# Patient Record
Sex: MALE | Race: WHITE | Employment: OTHER | URBAN - METROPOLITAN AREA
[De-identification: names, ages, dates, MRNs, and addresses within clinical notes are randomized per-mention and may not be internally consistent; named-entity substitution may affect disease eponyms.]

---

## 2017-02-11 ENCOUNTER — OFFICE VISIT (OUTPATIENT)
Dept: INTERNAL MEDICINE CLINIC | Age: 62
End: 2017-02-11

## 2017-02-11 VITALS
HEIGHT: 66 IN | HEART RATE: 74 BPM | BODY MASS INDEX: 30.37 KG/M2 | RESPIRATION RATE: 15 BRPM | TEMPERATURE: 96.5 F | OXYGEN SATURATION: 98 % | SYSTOLIC BLOOD PRESSURE: 122 MMHG | WEIGHT: 189 LBS | DIASTOLIC BLOOD PRESSURE: 78 MMHG

## 2017-02-11 DIAGNOSIS — H01.021 SQUAMOUS BLEPHARITIS OF RIGHT UPPER EYELID: ICD-10-CM

## 2017-02-11 DIAGNOSIS — H00.11 CHALAZION OF RIGHT UPPER EYELID: Primary | ICD-10-CM

## 2017-02-11 RX ORDER — ERYTHROMYCIN 5 MG/G
OINTMENT OPHTHALMIC
Qty: 1 TUBE | Refills: 0 | Status: SHIPPED | OUTPATIENT
Start: 2017-02-11 | End: 2017-03-03 | Stop reason: SDUPTHER

## 2017-02-11 NOTE — PATIENT INSTRUCTIONS
GO TO ER OR EYE DOCTOR IF SYMPTOMS WORSEN OR DO NOT IMPROVE AFTER TREATMENT. Styes and Chalazia: Care Instructions  Your Care Instructions    Styes and chalazia (say \"hpk-UPA-aeq-\") are both conditions that can cause swelling of the eyelid. A stye is an infection in the root of an eyelash. The infection causes a tender red lump on the edge of the eyelid. The infection can spread until the whole eyelid becomes red and inflamed. Styes usually break open, and a tiny amount of pus drains. They usually clear up on their own in about a week, but they sometimes need treatment with antibiotics. A chalazion is a lump or cyst in the eyelid (chalazion is singular; chalazia is plural). It is caused by swelling and inflammation of deep oil glands inside the eyelid. Chalazia are usually not infected. They can take a few months to heal.  If a chalazion becomes more swollen and painful or does not go away, you may need to have it drained by your doctor. Follow-up care is a key part of your treatment and safety. Be sure to make and go to all appointments, and call your doctor if you are having problems. It's also a good idea to know your test results and keep a list of the medicines you take. How can you care for yourself at home? · Do not rub your eyes. Do not squeeze or try to open a stye or chalazion. · To help a stye or chalazion heal faster:  ¨ Put a warm, moist compress on your eye for 5 to 10 minutes, 3 to 6 times a day. Heat often brings a stye to a point where it drains on its own. Keep in mind that warm compresses will often increase swelling a little at first.  ¨ Do not use hot water or heat a wet cloth in a microwave oven. The compress may get too hot and can burn the eyelid. · Always wash your hands before and after you use a compress or touch your eyes. · If the doctor gave you antibiotic drops or ointment, use the medicine exactly as directed.  Use the medicine for as long as instructed, even if your eye starts to feel better. · To put in eyedrops or ointment:  ¨ Tilt your head back, and pull your lower eyelid down with one finger. ¨ Drop or squirt the medicine inside the lower lid. ¨ Close your eye for 30 to 60 seconds to let the drops or ointment move around. ¨ Do not touch the ointment or dropper tip to your eyelashes or any other surface. · Do not wear eye makeup or contact lenses until the stye or chalazion heals. · Do not share towels, pillows, or washcloths while you have a stye. When should you call for help? Call your doctor now or seek immediate medical care if:  · You have pain in your eye. · You have a change in vision or loss of vision. · Redness and swelling get much worse. Watch closely for changes in your health, and be sure to contact your doctor if:  · Your stye does not get better in 1 week. · Your chalazion does not start to get better after several weeks. Where can you learn more? Go to http://hilaria-jaswant.info/. Enter X425 in the search box to learn more about \"Styes and Chalazia: Care Instructions. \"  Current as of: May 23, 2016  Content Version: 11.1  © 4193-7532 [employer]. Care instructions adapted under license by [employer] (which disclaims liability or warranty for this information). If you have questions about a medical condition or this instruction, always ask your healthcare professional. [place] [address] any warranty or liability for your use of this information. Blepharitis: Care Instructions  Your Care Instructions    Blepharitis is an inflammation or infection of the eyelids. It causes dry, scaly crusts on the eyelids. It can also cause your eyes to itch, burn, and look red. This problem is more common in people who have rosacea, dandruff, skin allergies, or eczema. Home treatment can help you keep your eyes comfortable.  Your doctor may also prescribe an ointment to put on your eyelids. Follow-up care is a key part of your treatment and safety. Be sure to make and go to all appointments, and call your doctor if you are having problems. It's also a good idea to know your test results and keep a list of the medicines you take. How can you care for yourself at home? · Wash your eyelids and eyebrows daily with baby shampoo. To wash your eyelids:  ¨ Place a very warm washcloth over your eyes for about a minute. This will help soften and loosen the crusts on your eyelashes. ¨ Put a few drops of baby shampoo on a warm washcloth. ¨ Gently wipe your eyelids. This helps remove any crust. It also cleans your eyelids. ¨ Rinse well with water. · Be safe with medicines. If your doctor prescribed medicine for you, use it exactly as directed. Call your doctor if you think you are having a problem with your medicine. When should you call for help? Call your doctor now or seek immediate medical care if:  · You have new vision problems. · Your eyes hurt. · Your eyelids bleed. Watch closely for changes in your health, and be sure to contact your doctor if:  · After 2 weeks of home treatment, your symptoms are not getting better. · Your eye turns red, gets very teary, or has discharge. Where can you learn more? Go to http://hilaria-jaswant.info/. Enter U088 in the search box to learn more about \"Blepharitis: Care Instructions. \"  Current as of: May 23, 2016  Content Version: 11.1  © 6857-6070 [email or web address]. Care instructions adapted under license by [employer] (which disclaims liability or warranty for this information). If you have questions about a medical condition or this instruction, always ask your healthcare professional. [place] [address] any warranty or liability for your use of this information.

## 2017-02-11 NOTE — MR AVS SNAPSHOT
Visit Information Date & Time Provider Department Dept. Phone Encounter #  
 2/11/2017  1:00 PM Sarah Perez[address] [id number] Follow-up Instructions Return if symptoms worsen or fail to improve. Upcoming Health Maintenance Date Due INFLUENZA AGE 9 TO ADULT 8/1/2016 COLONOSCOPY 3/24/2019 DTaP/Tdap/Td series (2 - Td) 7/25/2026 Allergies as of 2/11/2017  Review Complete On: 2/11/2017 By: Sarah Perez,  No Known Allergies Current Immunizations  Never Reviewed Name Date Tdap 7/25/2016  4:43 PM  
  
 Not reviewed this visit You Were Diagnosed With   
  
 Codes Comments Chalazion of right upper eyelid    -  Primary ICD-10-CM: H00.11 ICD-9-CM: 373.2 Squamous blepharitis of right upper eyelid     ICD-10-CM: H01.021 
ICD-9-CM: 373.02 Vitals BP Pulse Temp Resp Height(growth percentile) Weight(growth percentile) 122/78 (BP 1 Location: Left arm, BP Patient Position: Sitting) 74 96.5 °F (35.8 °C) (Oral) 15 5' 6\" (1.676 m) 189 lb (85.7 kg) SpO2 BMI Smoking Status 98% 30.51 kg/m2 Former Smoker Vitals History BMI and BSA Data Body Mass Index Body Surface Area 30.51 kg/m 2 2 m 2 Preferred Pharmacy Pharmacy Name Phone [facility] [phone] Your Updated Medication List  
  
   
This list is accurate as of: 2/11/17  1:32 PM.  Always use your most recent med list.  
  
  
  
  
 acyclovir 400 mg tablet Commonly known as:  ZOVIRAX  
take 1 tablet by mouth twice a day  
  
 aspirin delayed-release 81 mg tablet Take 81 mg by mouth daily. desonide 0.05 % topical ointment Commonly known as:  [place] Apply  to affected area two (2) times a day. erythromycin ophthalmic ointment Commonly known as:  ILOTYCIN Apply small amount to right upper eye lid margin at bedtime for 7 days. levothyroxine 88 mcg tablet Commonly known as:  SYNTHROID  
take 1 tablet by mouth once daily before BREAKFAST  
  
 tamsulosin 0.4 mg capsule Commonly known as:  FLOMAX Take 1 Cap by mouth daily. Prescriptions Sent to Pharmacy Refills  
 erythromycin (ILOTYCIN) ophthalmic ointment 0 Sig: Apply small amount to right upper eye lid margin at bedtime for 7 days. Class: Normal  
 Pharmacy: [address] Susannah Chozaheer [address] [place] [address] #: [phone] Follow-up Instructions Return if symptoms worsen or fail to improve. Patient Instructions GO TO ER OR EYE DOCTOR IF SYMPTOMS WORSEN OR DO NOT IMPROVE AFTER TREATMENT. Styes and Chalazia: Care Instructions Your Care Instructions Styes and chalazia (say \"vnr-WMQ-hwe-\") are both conditions that can cause swelling of the eyelid. A stye is an infection in the root of an eyelash. The infection causes a tender red lump on the edge of the eyelid. The infection can spread until the whole eyelid becomes red and inflamed. Styes usually break open, and a tiny amount of pus drains. They usually clear up on their own in about a week, but they sometimes need treatment with antibiotics. A chalazion is a lump or cyst in the eyelid (chalazion is singular; chalazia is plural). It is caused by swelling and inflammation of deep oil glands inside the eyelid. Chalazia are usually not infected. They can take a few months to heal. 
If a chalazion becomes more swollen and painful or does not go away, you may need to have it drained by your doctor. Follow-up care is a key part of your treatment and safety. Be sure to make and go to all appointments, and call your doctor if you are having problems. It's also a good idea to know your test results and keep a list of the medicines you take. How can you care for yourself at home? · Do not rub your eyes. Do not squeeze or try to open a stye or chalazion. · To help a stye or chalazion heal faster: ¨ Put a warm, moist compress on your eye for 5 to 10 minutes, 3 to 6 times a day. Heat often brings a stye to a point where it drains on its own. Keep in mind that warm compresses will often increase swelling a little at first. 
¨ Do not use hot water or heat a wet cloth in a microwave oven. The compress may get too hot and can burn the eyelid. · Always wash your hands before and after you use a compress or touch your eyes. · If the doctor gave you antibiotic drops or ointment, use the medicine exactly as directed. Use the medicine for as long as instructed, even if your eye starts to feel better. · To put in eyedrops or ointment: ¨ Tilt your head back, and pull your lower eyelid down with one finger. ¨ Drop or squirt the medicine inside the lower lid. ¨ Close your eye for 30 to 60 seconds to let the drops or ointment move around. ¨ Do not touch the ointment or dropper tip to your eyelashes or any other surface. · Do not wear eye makeup or contact lenses until the stye or chalazion heals. · Do not share towels, pillows, or washcloths while you have a stye. When should you call for help? Call your doctor now or seek immediate medical care if: 
· You have pain in your eye. · You have a change in vision or loss of vision. · Redness and swelling get much worse. Watch closely for changes in your health, and be sure to contact your doctor if: 
· Your stye does not get better in 1 week. · Your chalazion does not start to get better after several weeks. Where can you learn more? Go to http://hilaria-jaswant.info/. Enter K613 in the search box to learn more about \"Styes and Chalazia: Care Instructions. \" Current as of: May 23, 2016 Content Version: 11.1 © 0102-2392 [employer].  Care instructions adapted under license by [employer] (which disclaims liability or warranty for this information). If you have questions about a medical condition or this instruction, always ask your healthcare professional. [address] any warranty or liability for your use of this information. Blepharitis: Care Instructions Your Care Instructions Blepharitis is an inflammation or infection of the eyelids. It causes dry, scaly crusts on the eyelids. It can also cause your eyes to itch, burn, and look red. This problem is more common in people who have rosacea, dandruff, skin allergies, or eczema. Home treatment can help you keep your eyes comfortable. Your doctor may also prescribe an ointment to put on your eyelids. Follow-up care is a key part of your treatment and safety. Be sure to make and go to all appointments, and call your doctor if you are having problems. It's also a good idea to know your test results and keep a list of the medicines you take. How can you care for yourself at home? · Wash your eyelids and eyebrows daily with baby shampoo. To wash your eyelids: 
¨ Place a very warm washcloth over your eyes for about a minute. This will help soften and loosen the crusts on your eyelashes. ¨ Put a few drops of baby shampoo on a warm washcloth. ¨ Gently wipe your eyelids. This helps remove any crust. It also cleans your eyelids. ¨ Rinse well with water. · Be safe with medicines. If your doctor prescribed medicine for you, use it exactly as directed. Call your doctor if you think you are having a problem with your medicine. When should you call for help? Call your doctor now or seek immediate medical care if: 
· You have new vision problems. · Your eyes hurt. · Your eyelids bleed. Watch closely for changes in your health, and be sure to contact your doctor if: · After 2 weeks of home treatment, your symptoms are not getting better. · Your eye turns red, gets very teary, or has discharge. Where can you learn more? Go to http://hilaria-jaswant.info/. Enter A802 in the search box to learn more about \"Blepharitis: Care Instructions. \" Current as of: May 23, 2016 Content Version: 11.1 © 4651-9031 [employer]. Care instructions adapted under license by [employer] (which disclaims liability or warranty for this information). If you have questions about a medical condition or this instruction, always ask your healthcare professional. [address] any warranty or liability for your use of this information. Introducing [facility]! Dear Alicia Diss: 
Thank you for requesting a [employer] account. Our records indicate that you already have an active [employer] account. You can access your account anytime at [email or web address] Did you know that you can access your hospital and ER discharge instructions at any time in [employer]? You can also review all of your test results from your hospital stay or ER visit. Additional Information If you have questions, please visit the Frequently Asked Questions section of the [employer] website at [email or web address] Remember, [employer] is NOT to be used for urgent needs. For medical emergencies, dial 911. Now available from your iPhone and Android! Please provide this summary of care documentation to your next provider. Your primary care clinician is listed as [facility]. If you have any questions after today's visit, please call [phone].

## 2017-02-11 NOTE — PROGRESS NOTES
SUBJECTIVE:   HPI:  Lynette Oakley III is a 59 y.o. male who complains of right eye stye. Noticed some irritation over right upper lid a week and half ago. Over the last 3-4 days redness and swelling worsened. Has tried warm compresses twice in the last 3 days for only 30 minutes. No eye pain, no vision changes. ROS:    · General: negative for chills, fever, weight changes or malaise  · Respiratory: no cough, shortness of breath, or wheezing  · Cardiovascular: no chest pain, palpitations, or dyspnea on exertion  · Gastrointestinal: no GERD or history of PUD, abdominal pain, N/V, change in bowel habits, or black or bloody stools  · Musculoskeletal: no muscle weakness  · Neurological: no numbness, tingling, headache or dizziness    Past Medical History   Diagnosis Date    Abscess and cellulitis 8/3/2015    Acquired hypothyroidism 1/11/2016    Arrhythmia     BPH (benign prostatic hyperplasia)     Cancer (HCC)      bowens disease, abdomen    Genital herpes     Scoliosis     Sigmoid polyp 3/24/16    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 2005    Hx colonoscopy  10/15/2012     Dr Jessica Elkins, polyps, 3 yr follow up    Hx premalig/benign skin lesion excision      Hx hernia repair Left 04/2015    Hx other surgical  3/24/16     sigmoidoscopy, Dr. Kain Low. Mitch Banda Hx other surgical  7/15/2014     radical neck, [facility]    Hx other surgical  08/05/2016     sigmoidoscopy, Dr. Dennis Perez     Outpatient Prescriptions Marked as Taking for the 2/11/17 encounter (Office Visit) with Mary Oro DO   Medication Sig Dispense Refill    tamsulosin (FLOMAX) 0.4 mg capsule Take 1 Cap by mouth daily.  30 Cap 3    acyclovir (ZOVIRAX) 400 mg tablet take 1 tablet by mouth twice a day (Patient taking differently: take 1 tablet by mouth daily) 60 Tab 5    desonide (DESOWEN) 0.05 % topical ointment Apply  to affected area two (2) times a day. 30 g 5    levothyroxine (SYNTHROID) 88 mcg tablet take 1 tablet by mouth once daily before BREAKFAST 30 Tab 5    aspirin delayed-release 81 mg tablet Take 81 mg by mouth daily. No Known Allergies    OBJECTIVE:  Visit Vitals    /78 (BP 1 Location: Left arm, BP Patient Position: Sitting)    Pulse 74    Temp 96.5 °F (35.8 °C) (Oral)    Resp 15    Ht 5' 6\" (1.676 m)    Wt 189 lb (85.7 kg)    SpO2 98%    BMI 30.51 kg/m2      GEN: awake, alert, orientated, in no acute distress  EYES: eyrthema and swelling to right upper lid, nontender. No fluctuance. NECK: No lymphadenopathy, no appearing thyroid  CV:  The heart sounds are regular in rate and rhythm. There is a normal S1 and S2. There or no murmurs, rubs, or gallops. Distal pulses are intact and equal. No peripheral edema. Lungs:  Lung sounds are clear and equal to auscultation throughout all lung fields. ASSESSMENT/PLAN:   1. Chalazion of right upper eyelid - warm compresses at least 15-20 minutes, 4 times a day. If no improvement or if symptoms worsen recommend he goes to an ophthalmologist for further evaluation and treatment. 2. Squamous blepharitis of right upper eyelid - minor blepharitis to right upper lid margin. Clean with baby shampoo. Trial of erythromycin ointment to outer lid at bedtime. - erythromycin (ILOTYCIN) ophthalmic ointment; Apply small amount to right upper eye lid margin at bedtime for 7 days.   Dispense: 1 Tube; Refill: 0

## 2017-02-11 NOTE — PROGRESS NOTES
Pt presented today for right eye problem-stye x 1 week. Pt sated she has applied warm compress to area. Has pt had any falls since last 30 days? No.  Pt preferred language for health care discussion is english. Pt stated he has a rountine CT scan this past January. Advanced Directive? No    Is someone accompanying this pt? No    Is the patient using any DME equipment during OV? No      1. Have you been to the ER, urgent care clinic since your last 30 days? Hospitalized since your last 30 days? No    2. Have you seen or consulted any other health care providers outside of the [address] [place] [address] since your last 30 days? Yes Dr Daniel Navarro? ( cardio) and Dr Josy Palomino ( chemo)      Pt  has a reminder for a \"due or due soon\" health maintenance. I have asked that he  contact his primary care provider for follow-up on this health maintenance.

## 2017-03-03 ENCOUNTER — OFFICE VISIT (OUTPATIENT)
Dept: INTERNAL MEDICINE CLINIC | Age: 62
End: 2017-03-03

## 2017-03-03 VITALS
SYSTOLIC BLOOD PRESSURE: 118 MMHG | HEART RATE: 80 BPM | HEIGHT: 66 IN | DIASTOLIC BLOOD PRESSURE: 74 MMHG | RESPIRATION RATE: 16 BRPM | WEIGHT: 190 LBS | BODY MASS INDEX: 30.53 KG/M2 | OXYGEN SATURATION: 99 % | TEMPERATURE: 98.1 F

## 2017-03-03 DIAGNOSIS — H00.012 HORDEOLUM EXTERNUM OF RIGHT LOWER EYELID: Primary | ICD-10-CM

## 2017-03-03 RX ORDER — FLECAINIDE ACETATE 50 MG/1
50 TABLET ORAL 2 TIMES DAILY
COMMUNITY
Start: 2017-02-28

## 2017-03-03 RX ORDER — ERYTHROMYCIN 5 MG/G
OINTMENT OPHTHALMIC
COMMUNITY
Start: 2017-02-11 | End: 2017-03-03 | Stop reason: ALTCHOICE

## 2017-03-03 NOTE — PROGRESS NOTES
HISTORY OF PRESENT ILLNESS  Tato Contreras III is a 59 y.o. male. Stye   The history is provided by the patient. This is a new problem. The current episode started more than 1 week ago. The problem occurs constantly. The problem has not changed since onset. Pertinent negatives include no chest pain and no headaches. Nothing aggravates the symptoms. Nothing relieves the symptoms. Treatments tried: eryhthromycin ointment. The treatment provided no relief. Review of Systems   Constitutional: Negative for chills and fever. Eyes: Negative for blurred vision, double vision, photophobia, pain, discharge and redness. Cardiovascular: Negative for chest pain and palpitations. Gastrointestinal: Negative for heartburn. Skin: Negative for rash. Neurological: Negative for dizziness and headaches. Psychiatric/Behavioral: Negative for depression. Physical Exam   Constitutional: He is oriented to person, place, and time. He appears well-developed and well-nourished. No distress. HENT:   Head: Normocephalic and atraumatic. Eyes: EOM are normal. Pupils are equal, round, and reactive to light. Right eye exhibits no discharge. Left eye exhibits no discharge. Eyelid: stye present. Neck: Normal range of motion. Cardiovascular: Regular rhythm. Pulmonary/Chest: Breath sounds normal.   Lymphadenopathy:     He has no cervical adenopathy. Neurological: He is alert and oriented to person, place, and time. Skin: He is not diaphoretic. Psychiatric: He has a normal mood and affect. Nursing note and vitals reviewed. ASSESSMENT and PLAN    ICD-10-CM ICD-9-CM    1.  Hordeolum externum of right lower eyelid H00.012 373.11 tobramycin (TOBREX) 0.3 % ophthalmic ointment      REFERRAL TO OPHTHALMOLOGY   patient is referred to eye doctor to rule out sebaceous cyst.

## 2017-03-03 NOTE — MR AVS SNAPSHOT
Visit Information Date & Time Provider Department Dept. Phone Encounter #  
 3/3/2017  3:45 PM Marclaksh[facility]ad [facility] [phone] [id number] Upcoming Health Maintenance Date Due INFLUENZA AGE 9 TO ADULT 8/1/2016 COLONOSCOPY 3/24/2019 DTaP/Tdap/Td series (2 - Td) 7/25/2026 Allergies as of 3/3/2017  Review Complete On: 3/3/2017 By: Kathya Murray LPN No Known Allergies Current Immunizations  Never Reviewed Name Date Tdap 7/25/2016  4:43 PM  
  
 Not reviewed this visit You Were Diagnosed With   
  
 Codes Comments Hordeolum externum of right lower eyelid    -  Primary ICD-10-CM: H00.012 ICD-9-CM: 373.11 Vitals BP Pulse Temp Resp Height(growth percentile) Weight(growth percentile) 118/74 (BP 1 Location: Right arm, BP Patient Position: Sitting) 80 98.1 °F (36.7 °C) (Oral) 16 5' 6\" (1.676 m) 190 lb (86.2 kg) SpO2 BMI Smoking Status 99% 30.67 kg/m2 Former Smoker BMI and BSA Data Body Mass Index Body Surface Area  
 30.67 kg/m 2 2 m 2 Preferred Pharmacy Pharmacy Name Phone [facility] [phone] Your Updated Medication List  
  
   
This list is accurate as of: 3/3/17  4:15 PM.  Always use your most recent med list.  
  
  
  
  
 acyclovir 400 mg tablet Commonly known as:  ZOVIRAX  
take 1 tablet by mouth twice a day  
  
 aspirin delayed-release 81 mg tablet Take 81 mg by mouth daily. desonide 0.05 % topical ointment Commonly known as:  Estefany [place] Apply  to affected area two (2) times a day. flecainide 50 mg tablet Commonly known as:  TAMBOCOR 50 mg.  
  
 levothyroxine 88 mcg tablet Commonly known as:  SYNTHROID  
take 1 tablet by mouth once daily before BREAKFAST  
  
 tamsulosin 0.4 mg capsule Commonly known as:  FLOMAX Take 1 Cap by mouth daily. tobramycin 0.3 % ophthalmic ointment Commonly known as:  Dolph Hedges Apply on affected area twice daily for 5 days. Prescriptions Sent to Pharmacy Refills  
 tobramycin (TOBREX) 0.3 % ophthalmic ointment 0 Sig: Apply on affected area twice daily for 5 days. Class: Normal  
 [facility]acy: [facility] [address] Chris Coronado54  #: [phone] We Performed the Following REFERRAL TO OPHTHALMOLOGY [REF57 Custom] Comments:  
 Please evaluate patient for hordeolum/ sbacious cyst.  
  
Referral Information Referral ID Referred By Referred To  
  
 [id number] Karyle Spies Not Available Visits Status Start Date End Date 1 New Request 3/3/17 3/3/18 If your referral has a status of pending review or denied, additional information will be sent to support the outcome of this decision. Patient Instructions Skin Abscess: Care Instructions Your Care Instructions A skin abscess is a bacterial infection that forms a pocket of pus. A boil is a kind of skin abscess. The doctor may have cut an opening in the abscess so that the pus can drain out. You may have gauze in the cut so that the abscess will stay open and keep draining. You may need antibiotics. You will need to follow up with your doctor to make sure the infection has gone away. The doctor has checked you carefully, but problems can develop later. If you notice any problems or new symptoms, get medical treatment right away. Follow-up care is a key part of your treatment and safety. Be sure to make and go to all appointments, and call your doctor if you are having problems. It's also a good idea to know your test results and keep a list of the medicines you take. How can you care for yourself at home? · Apply warm and dry compresses, a heating pad set on low, or a hot water bottle 3 or 4 times a day for pain. Keep a cloth between the heat source and your skin. · If your doctor prescribed antibiotics, take them as directed. Do not stop taking them just because you feel better. You need to take the full course of antibiotics. · Take pain medicines exactly as directed. ¨ If the doctor gave you a prescription medicine for pain, take it as prescribed. ¨ If you are not taking a prescription pain medicine, ask your doctor if you can take an over-the-counter medicine. · Keep your bandage clean and dry. Change the bandage whenever it gets wet or dirty, or at least one time a day. · If the abscess was packed with gauze: 
¨ Keep follow-up appointments to have the gauze changed or removed. If the doctor instructed you to remove the gauze, gently pull out all of the gauze when your doctor tells you to. ¨ After the gauze is removed, soak the area in warm water for 15 to 20 minutes 2 times a day, until the wound closes. When should you call for help? Call your doctor now or seek immediate medical care if: 
· You have signs of worsening infection, such as: 
¨ Increased pain, swelling, warmth, or redness. ¨ Red streaks leading from the infected skin. ¨ Pus draining from the wound. ¨ A fever. Watch closely for changes in your health, and be sure to contact your doctor if: 
· You do not get better as expected. Where can you learn more? Go to http://hilaria-jaswant.info/. Enter Q300 in the search box to learn more about \"Skin Abscess: Care Instructions. \" Current as of: February 5, 2016 Content Version: 11.1 © 9002-6430 [employer]. Care instructions adapted under license by [employer] (which disclaims liability or warranty for this information). If you have questions about a medical condition or this instruction, always ask your healthcare professional. [address] any warranty or liability for your use of this information. Introducing [facility] & HEALTH SERVICES! Dear Levi : Thank you for requesting a Dympol account. Our records indicate that you already have an active Dympol account. You can access your account anytime at [email or web address] Did you know that you can access your hospital and ER discharge instructions at any time in Dympol? You can also review all of your test results from your hospital stay or ER visit. Additional Information If you have questions, please visit the Frequently Asked Questions section of the Dympol website at [email or web address] Remember, Dympol is NOT to be used for urgent needs. For medical emergencies, dial 911. Now available from your iPhone and Android! Please provide this summary of care documentation to your next provider. Your primary care clinician is listed as [facility]. If you have any questions after today's visit, please call [phone].

## 2017-03-03 NOTE — PROGRESS NOTES
Pt presented today with stye to right eye x 3-4 weeks. Has pt had any falls since last visit? No.  Pt preferred language for health care discussion is english. Advanced Directive? No    Is someone accompanying this pt? No    Is the patient using any DME equipment during OV? No      1. Have you been to the ER, urgent care clinic since your last visit? Hospitalized since your last visit? No    2. Have you seen or consulted any other health care providers outside of the [address] [place] [address] since your last visit? Include any colon screening. No      Pt has a reminder for a \"due or due soon\" health maintenance. I have asked that he contact his primary care provider for follow-up on this health maintenance.

## 2017-03-03 NOTE — PATIENT INSTRUCTIONS

## 2017-03-23 ENCOUNTER — OFFICE VISIT (OUTPATIENT)
Dept: INTERNAL MEDICINE CLINIC | Age: 62
End: 2017-03-23

## 2017-03-23 VITALS
HEIGHT: 66 IN | TEMPERATURE: 98.2 F | HEART RATE: 81 BPM | DIASTOLIC BLOOD PRESSURE: 84 MMHG | WEIGHT: 196 LBS | SYSTOLIC BLOOD PRESSURE: 125 MMHG | OXYGEN SATURATION: 97 % | RESPIRATION RATE: 15 BRPM | BODY MASS INDEX: 31.5 KG/M2

## 2017-03-23 DIAGNOSIS — M79.621 AXILLARY PAIN, RIGHT: Primary | ICD-10-CM

## 2017-03-23 NOTE — PROGRESS NOTES
ROOM # 4    Oneida Adam presents today for   Chief Complaint   Patient presents with    Arm Pain     right axillary        Nithya Sampson III preferred language for health care discussion is english/other. Is someone accompanying this pt? no    Is the patient using any DME equipment during OV? no    Depression Screening:  PHQ 2 / 9, over the last two weeks 2/11/2017 2/11/2017 7/25/2016 1/25/2016 3/19/2015 4/23/2014 3/14/2014   Little interest or pleasure in doing things Not at all Not at all Not at all Not at all Not at all Not at all Not at all   Feeling down, depressed or hopeless Not at all Not at all Not at all Not at all Not at all Not at all Not at all   Total Score PHQ 2 0 0 0 0 0 0 0       Learning Assessment:  Learning Assessment 1/29/2015 3/14/2014   PRIMARY LEARNER Patient Patient   HIGHEST LEVEL OF EDUCATION - PRIMARY LEARNER  SOME COLLEGE 2 YEARS [address] [employer] LEARNER NONE NONE   CO-LEARNER CAREGIVER - No   PRIMARY [address]    NEED - No   LEARNER PREFERENCE PRIMARY DEMONSTRATION DEMONSTRATION   LEARNING SPECIAL TOPICS - none   ANSWERED BY patient self   RELATIONSHIP SELF SELF       Abuse Screening:  Abuse Screening Questionnaire 4/16/2015   Do you ever feel afraid of your partner? N   Are you in a relationship with someone who physically or mentally threatens you? N   Is it safe for you to go home? Y       Fall Risk  No flowsheet data found. Health Maintenance reviewed and discussed per provider. Yes    Advance Directive:  1. Do you have an advance directive in place? Patient Reply: no    2. If not, would you like material regarding how to put one in place? Patient Reply: no    Coordination of Care:  1. Have you been to the ER, urgent care clinic since your last visit? Hospitalized since your last visit? no    2. Have you seen or consulted any other health care providers outside of the [address] [place] [address] since your last visit? Include any pap smears or colon screening.  no

## 2017-03-23 NOTE — MR AVS SNAPSHOT
Visit Information Date & Time Provider Department Dept. Phone Encounter #  
 3/23/2017  3:30 PM Arnold [facility]ayo[facility] [phone] [id number] Follow-up Instructions Return if symptoms worsen or fail to improve, for as appointed. Upcoming Health Maintenance Date Due INFLUENZA AGE 9 TO ADULT 8/1/2016 COLONOSCOPY 3/24/2019 DTaP/Tdap/Td series (2 - Td) 7/25/2026 Allergies as of 3/23/2017  Review Complete On: 3/23/2017 By: Beck Campos MD  
 No Known Allergies Current Immunizations  Never Reviewed Name Date Tdap 7/25/2016  4:43 PM  
  
 Not reviewed this visit You Were Diagnosed With   
  
 Codes Comments Axillary pain, right    -  Primary ICD-10-CM: H43.533 ICD-9-CM: 729.5 Vitals BP Pulse Temp Resp Height(growth percentile) Weight(growth percentile) 125/84 81 98.2 °F (36.8 °C) (Oral) 15 5' 6\" (1.676 m) 196 lb (88.9 kg) SpO2 BMI Smoking Status 97% 31.64 kg/m2 Former Smoker BMI and BSA Data Body Mass Index Body Surface Area  
 31.64 kg/m 2 2.03 m 2 Preferred Pharmacy Pharmacy Name Phone [facility]54 [phone] Your Updated Medication List  
  
   
This list is accurate as of: 3/23/17  3:52 PM.  Always use your most recent med list.  
  
  
  
  
 acyclovir 400 mg tablet Commonly known as:  ZOVIRAX  
take 1 tablet by mouth twice a day  
  
 aspirin delayed-release 81 mg tablet Take 81 mg by mouth daily. desonide 0.05 % topical ointment Commonly known as:  Shaun Hidden Apply  to affected area two (2) times a day. flecainide 50 mg tablet Commonly known as:  TAMBOCOR 50 mg.  
  
 levothyroxine 88 mcg tablet Commonly known as:  SYNTHROID  
take 1 tablet by mouth once daily before BREAKFAST  
  
 tamsulosin 0.4 mg capsule Commonly known as:  FLOMAX Take 1 Cap by mouth daily. tobramycin 0.3 % ophthalmic ointment Commonly known as:  Clark Wallace Apply on affected area twice daily for 5 days. Follow-up Instructions Return if symptoms worsen or fail to improve, for as appointed. To-Do List   
 03/23/2017 Imaging:  XR SHOULDER RT AP/LAT MIN 2 V Introducing \[facility] [place] [facility]\"" & [facility] SERVICES! Dear Cordelia Bloom: 
Thank you for requesting a Kanchufang account. Our records indicate that you already have an active Kanchufang account. You can access your account anytime at [email or web address] Did you know that you can access your hospital and ER discharge instructions at any time in Kanchufang? You can also review all of your test results from your hospital stay or ER visit. Additional Information If you have questions, please visit the Frequently Asked Questions section of the Kanchufang website at [email or web address] Remember, Kanchufang is NOT to be used for urgent needs. For medical emergencies, dial 911. Now available from your iPhone and Android! Please provide this summary of care documentation to your next provider. Your primary care clinician is listed as [facility]. If you have any questions after today's visit, please call [phone].

## 2017-03-23 NOTE — PROGRESS NOTES
HISTORY OF PRESENT ILLNESS  Joanne Leo III is a 59 y.o. male. Visit Vitals    /84    Pulse 81    Temp 98.2 °F (36.8 °C) (Oral)    Resp 15    Ht 5' 6\" (1.676 m)    Wt 196 lb (88.9 kg)    SpO2 97%    BMI 31.64 kg/m2       Arm Pain    History provided by: sore area in right axilla. ? slept on it wrong. This is a new problem. The current episode started more than 1 week ago. The problem occurs daily. The problem has been gradually improving. Quality: mild discomfort in the right axilla. Review of Systems   Constitutional: Negative. Respiratory: Negative. Cardiovascular: Negative. Negative for chest pain. Musculoskeletal:        Right axilla pain. Physical Exam   Constitutional: He is oriented to person, place, and time. He appears well-developed and well-nourished. No distress. Musculoskeletal: He exhibits no edema. Right shoulder is essentially normal. Axilla unremarkable. No lymph nodes felt. No tenderness. FROM. Pectoral muscle. Neurological: He is alert and oriented to person, place, and time. Skin: Skin is warm and dry. He is not diaphoretic. Psychiatric: He has a normal mood and affect. Nursing note and vitals reviewed. ASSESSMENT and PLAN    ICD-10-CM ICD-9-CM    1. Axillary pain, right M79.621 729.5 XR SHOULDER RT AP/LAT MIN 2 V       Reassurance. But will check shoulder XRay. He just had CT of chest and neck in Jan 2017--all looks good.  Just scar tissue

## 2017-05-01 DIAGNOSIS — E89.0 POSTOPERATIVE HYPOTHYROIDISM: ICD-10-CM

## 2017-05-01 RX ORDER — LEVOTHYROXINE SODIUM 88 UG/1
TABLET ORAL
Qty: 30 TAB | Refills: 5 | Status: SHIPPED | OUTPATIENT
Start: 2017-05-01 | End: 2017-06-21 | Stop reason: SDUPTHER

## 2017-05-01 NOTE — TELEPHONE ENCOUNTER
Requested Prescriptions     Pending Prescriptions Disp Refills    levothyroxine (SYNTHROID) 88 mcg tablet 30 Tab 5

## 2017-06-21 ENCOUNTER — OFFICE VISIT (OUTPATIENT)
Dept: INTERNAL MEDICINE CLINIC | Age: 62
End: 2017-06-21

## 2017-06-21 VITALS
TEMPERATURE: 97 F | DIASTOLIC BLOOD PRESSURE: 73 MMHG | HEIGHT: 67 IN | SYSTOLIC BLOOD PRESSURE: 120 MMHG | HEART RATE: 82 BPM | OXYGEN SATURATION: 97 % | WEIGHT: 198.2 LBS | RESPIRATION RATE: 14 BRPM | BODY MASS INDEX: 31.11 KG/M2

## 2017-06-21 DIAGNOSIS — Z76.0 MEDICATION REFILL: ICD-10-CM

## 2017-06-21 DIAGNOSIS — M54.32 SCIATICA OF LEFT SIDE: Primary | ICD-10-CM

## 2017-06-21 DIAGNOSIS — Z12.5 SCREENING FOR PROSTATE CANCER: ICD-10-CM

## 2017-06-21 DIAGNOSIS — E89.0 POSTOPERATIVE HYPOTHYROIDISM: ICD-10-CM

## 2017-06-21 DIAGNOSIS — I25.10 ASCVD (ARTERIOSCLEROTIC CARDIOVASCULAR DISEASE): Chronic | ICD-10-CM

## 2017-06-21 DIAGNOSIS — M54.32 SCIATICA OF LEFT SIDE: ICD-10-CM

## 2017-06-21 RX ORDER — TAMSULOSIN HYDROCHLORIDE 0.4 MG/1
0.4 CAPSULE ORAL DAILY
Qty: 30 CAP | Refills: 11 | Status: SHIPPED | OUTPATIENT
Start: 2017-06-21 | End: 2017-09-10 | Stop reason: SDUPTHER

## 2017-06-21 RX ORDER — LEVOTHYROXINE SODIUM 88 UG/1
TABLET ORAL
Qty: 30 TAB | Refills: 11 | Status: SHIPPED | OUTPATIENT
Start: 2017-06-21 | End: 2018-06-18 | Stop reason: SDUPTHER

## 2017-06-21 RX ORDER — DESONIDE 0.5 MG/G
OINTMENT TOPICAL 2 TIMES DAILY
Qty: 30 G | Refills: 11 | Status: SHIPPED | OUTPATIENT
Start: 2017-06-21 | End: 2018-08-23 | Stop reason: SDUPTHER

## 2017-06-21 RX ORDER — ACYCLOVIR 400 MG/1
TABLET ORAL
Qty: 30 TAB | Refills: 11 | Status: SHIPPED | OUTPATIENT
Start: 2017-06-21 | End: 2018-06-16 | Stop reason: SDUPTHER

## 2017-06-21 NOTE — PROGRESS NOTES
Avtar Santoyo III presents today for   Chief Complaint   Patient presents with    Medication Refill    Leg Pain       Avtar Santoyo III preferred language for health care discussion is english/other. Is someone accompanying this pt? no    Is the patient using any DME equipment during OV? no    Depression Screening:  PHQ over the last two weeks 2/11/2017 2/11/2017 7/25/2016 1/25/2016 3/19/2015 4/23/2014 3/14/2014   Little interest or pleasure in doing things Not at all Not at all Not at all Not at all Not at all Not at all Not at all   Feeling down, depressed or hopeless Not at all Not at all Not at all Not at all Not at all Not at all Not at all   Total Score PHQ 2 0 0 0 0 0 0 0       Learning Assessment:  Learning Assessment 1/29/2015 3/14/2014   PRIMARY LEARNER Patient Patient   HIGHEST LEVEL OF EDUCATION - PRIMARY LEARNER  SOME COLLEGE 2 YEARS [address] [employer] LEARNER NONE NONE   CO-LEARNER CAREGIVER - No   PRIMARY [address]    NEED - No   LEARNER PREFERENCE PRIMARY DEMONSTRATION DEMONSTRATION   LEARNING SPECIAL TOPICS - none   ANSWERED BY patient self   RELATIONSHIP SELF SELF       Abuse Screening:  Abuse Screening Questionnaire 4/16/2015   Do you ever feel afraid of your partner? N   Are you in a relationship with someone who physically or mentally threatens you? N   Is it safe for you to go home? Y       Fall Risk  No flowsheet data found. Health Maintenance reviewed and discussed per provider. Yes    Avtar Yarelis JOSEPH is due for   Please order/place referral if appropriate. Advance Directive:  1. Do you have an advance directive in place? Patient Reply: yes    2. If not, would you like material regarding how to put one in place? Patient Reply: no    Coordination of Care:  1. Have you been to the ER, urgent care clinic since your last visit? Hospitalized since your last visit? no    2.  Have you seen or consulted any other health care providers outside of the [address] [place] [address] since your last visit? Include any pap smears or colon screening.  no

## 2017-06-21 NOTE — PROGRESS NOTES
HISTORY OF PRESENT ILLNESS  Guillermo Arce III is a 59 y.o. male. Visit Vitals    /73 (BP 1 Location: Right arm, BP Patient Position: Sitting)    Pulse 82    Temp 97 °F (36.1 °C) (Oral)    Resp 14    Ht 5' 6.5\" (1.689 m)    Wt 198 lb 3.2 oz (89.9 kg)    SpO2 97%    BMI 31.51 kg/m2       HPI Comments: Pt's insurance has change so [facility]    Medication Refill   The history is provided by the patient. This is a new problem. Pertinent negatives include no chest pain. Leg Pain    The history is provided by the patient (off and on he gets a pain in left buttock and down his posterior leg. The leg will feel like it is going to give away. It is a dull ache. If he sits down the sxs resolve. Aron Lopez ). This is a new problem. The pain is mild. Associated symptoms include back pain. Associated symptoms comments: Leg feels weak. .       Review of Systems   Constitutional: Negative. Cardiovascular: Negative for chest pain and palpitations. Musculoskeletal: Positive for back pain. Neurological: Negative for sensory change, speech change and focal weakness. Physical Exam   Constitutional: He is oriented to person, place, and time. He appears well-developed and well-nourished. No distress. Cardiovascular: Normal rate. Pulmonary/Chest: Effort normal.   Musculoskeletal:   Neg SLR on the left. Good strength. Neurological: He is alert and oriented to person, place, and time. Skin: Skin is warm and dry. He is not diaphoretic. Psychiatric: He has a normal mood and affect. Nursing note and vitals reviewed. ASSESSMENT and PLAN    ICD-10-CM ICD-9-CM    1. Sciatica of left side G00.83 444.3 METABOLIC PANEL, COMPREHENSIVE   2. Postoperative hypothyroidism E89.0 244.0 levothyroxine (SYNTHROID) 88 mcg tablet      METABOLIC PANEL, COMPREHENSIVE      TSH AND FREE T4      CBC WITH AUTOMATED DIFF   3.  ASCVD (arteriosclerotic cardiovascular disease) I25.10 429.2 METABOLIC PANEL, COMPREHENSIVE     440.9 LIPID PANEL   4. Screening for prostate cancer Z12.5 V76.44 PROSTATE SPECIFIC AG (PSA)   5. Medication refill Z76.0 V68.1 levothyroxine (SYNTHROID) 88 mcg tablet      tamsulosin (FLOMAX) 0.4 mg capsule      acyclovir (ZOVIRAX) 400 mg tablet      desonide (DESOWEN) 0.05 % topical ointment     Discussed weight, lifestyle, diet and exercise.     F/u 2 months for annual CPE--lab to be done in advance

## 2017-06-21 NOTE — MR AVS SNAPSHOT
Visit Information Date & Time Provider Department Dept. Phone Encounter #  
 6/21/2017  3:30 PM [address] [phone] [id number] Follow-up Instructions Return in about 2 months (around 8/21/2017) for annual CPE. Upcoming Health Maintenance Date Due INFLUENZA AGE 9 TO ADULT 8/1/2017 COLONOSCOPY 3/24/2019 DTaP/Tdap/Td series (2 - Td) 7/25/2026 Allergies as of 6/21/2017  Review Complete On: 6/21/2017 By: Rubens Mckeon MD  
 No Known Allergies Current Immunizations  Never Reviewed Name Date Tdap 7/25/2016  4:43 PM  
  
 Not reviewed this visit You Were Diagnosed With   
  
 Codes Comments Sciatica of left side    -  Primary ICD-10-CM: M54.32 
ICD-9-CM: 724.3 Postoperative hypothyroidism     ICD-10-CM: E89.0 ICD-9-CM: 244.0 ASCVD (arteriosclerotic cardiovascular disease)     ICD-10-CM: I25.10 ICD-9-CM: 429.2, 440.9 Screening for prostate cancer     ICD-10-CM: Z12.5 ICD-9-CM: V76.44 Medication refill     ICD-10-CM: Z76.0 ICD-9-CM: V68.1 Vitals BP Pulse Temp Resp Height(growth percentile) Weight(growth percentile) 120/73 (BP 1 Location: Right arm, BP Patient Position: Sitting) 82 97 °F (36.1 °C) (Oral) 14 5' 6.5\" (1.689 m) 198 lb 3.2 oz (89.9 kg) SpO2 BMI Smoking Status 97% 31.51 kg/m2 Former Smoker Vitals History BMI and BSA Data Body Mass Index Body Surface Area  
 31.51 kg/m 2 2.05 m 2 Preferred Pharmacy Pharmacy Name Phone [address] [phone] Your Updated Medication List  
  
   
This list is accurate as of: 6/21/17  4:20 PM.  Always use your most recent med list.  
  
  
  
  
 acyclovir 400 mg tablet Commonly known as:  ZOVIRAX  
take 1 tablet by mouth daily  
  
 aspirin delayed-release 81 mg tablet Take 81 mg by mouth daily. desonide 0.05 % topical ointment Commonly known as:  Donna Primo Apply  to affected area two (2) times a day. flecainide 50 mg tablet Commonly known as:  TAMBOCOR 50 mg.  
  
 levothyroxine 88 mcg tablet Commonly known as:  SYNTHROID  
take 1 tablet by mouth once daily before BREAKFAST  
  
 tamsulosin 0.4 mg capsule Commonly known as:  FLOMAX Take 1 Cap by mouth daily. tobramycin 0.3 % ophthalmic ointment Commonly known as:  Jayden Montgomery Apply on affected area twice daily for 5 days. Prescriptions Sent to Pharmacy Refills  
 levothyroxine (SYNTHROID) 88 mcg tablet 11 Sig: take 1 tablet by mouth once daily before BREAKFAST Class: Normal  
 Pharmacy: [address]  [address] Ph #: [phone]  
 tamsulosin (FLOMAX) 0.4 mg capsule 11 Sig: Take 1 Cap by mouth daily. Class: Normal  
 Pharmacy: [address] Ph #: [phone] Route: Oral  
 acyclovir (ZOVIRAX) 400 mg tablet 11 Sig: take 1 tablet by mouth daily Class: Normal  
 Pharmacy: [address]  [address] Ph #: [phone]  
 desonide (DESOWEN) 0.05 % topical ointment 11 Sig: Apply  to affected area two (2) times a day. Class: Normal  
 Pharmacy: [address] Ph #: [phone] Route: Topical  
  
Follow-up Instructions Return in about 2 months (around 8/21/2017) for annual CPE. To-Do List   
 06/21/2017 Lab:  CBC WITH AUTOMATED DIFF   
  
 06/21/2017 Lab:  LIPID PANEL   
  
 06/21/2017 Lab:  METABOLIC PANEL, COMPREHENSIVE   
  
 06/21/2017 Lab:  PROSTATE SPECIFIC AG (PSA)   
  
 06/21/2017 Lab:  TSH AND FREE T4 Introducing [facility] & HEALTH SERVICES! Dear Ralf Talley: 
Thank you for requesting a OnMyBlock account. Our records indicate that you already have an active OnMyBlock account. You can access your account anytime at [email or web address] Did you know that you can access your hospital and ER discharge instructions at any time in OnMyBlock? You can also review all of your test results from your hospital stay or ER visit. Additional Information If you have questions, please visit the Frequently Asked Questions section of the OnMyBlock website at [email or web address] Remember, OnMyBlock is NOT to be used for urgent needs. For medical emergencies, dial 911. Now available from your iPhone and Android! Please provide this summary of care documentation to your next provider. Your primary care clinician is listed as [facility]. If you have any questions after today's visit, please call [phone].

## 2017-07-18 ENCOUNTER — OFFICE VISIT (OUTPATIENT)
Dept: INTERNAL MEDICINE CLINIC | Age: 62
End: 2017-07-18

## 2017-07-18 VITALS
HEIGHT: 66 IN | SYSTOLIC BLOOD PRESSURE: 144 MMHG | TEMPERATURE: 98.3 F | BODY MASS INDEX: 31.85 KG/M2 | WEIGHT: 198.2 LBS | HEART RATE: 76 BPM | OXYGEN SATURATION: 98 % | RESPIRATION RATE: 18 BRPM | DIASTOLIC BLOOD PRESSURE: 91 MMHG

## 2017-07-18 DIAGNOSIS — M54.9 BACK PAIN, UNSPECIFIED BACK LOCATION, UNSPECIFIED BACK PAIN LATERALITY, UNSPECIFIED CHRONICITY: Primary | ICD-10-CM

## 2017-07-18 LAB
BILIRUB UR QL STRIP: NEGATIVE
GLUCOSE UR-MCNC: NEGATIVE MG/DL
KETONES P FAST UR STRIP-MCNC: NEGATIVE MG/DL
PH UR STRIP: 7 [PH] (ref 4.6–8)
PROT UR QL STRIP: NEGATIVE MG/DL
SP GR UR STRIP: 1.01 (ref 1–1.03)
UA UROBILINOGEN AMB POC: NORMAL (ref 0.2–1)
URINALYSIS CLARITY POC: CLEAR
URINALYSIS COLOR POC: YELLOW
URINE BLOOD POC: NEGATIVE
URINE LEUKOCYTES POC: NEGATIVE
URINE NITRITES POC: NEGATIVE

## 2017-07-18 RX ORDER — BACLOFEN 10 MG/1
10 TABLET ORAL
Qty: 90 TAB | Refills: 1 | Status: SHIPPED | OUTPATIENT
Start: 2017-07-18 | End: 2018-02-23

## 2017-07-18 NOTE — PROGRESS NOTES
FAMILY MEDICINE CLINIC NOTE    S: The patient presents with intermittent bilateral thoracolumbar back pain secondary to chronic spasms. He does not utilize any pain medication for this regularly. Had an acute spasm of the left lumbar musculature this morning while in the shower which took his breath away. Currently the pain is not severe. No dysuria, fever or nausea.      O:  Visit Vitals    BP (!) 144/91 (BP 1 Location: Left arm, BP Patient Position: Sitting)    Pulse 76    Temp 98.3 °F (36.8 °C) (Oral)    Resp 18    Ht 5' 6\" (1.676 m)    Wt 198 lb 3.2 oz (89.9 kg)    SpO2 98%    BMI 31.99 kg/m2     NAD, comfortable  RRR, no murmurs  CTABL, no wheezing/ronchi/rales  Multiple large spasms palpated in bilateral lumbar musculature, mild TTP     59 y.o. male      ICD-10-CM ICD-9-CM    1. Back pain, unspecified back location, unspecified back pain laterality, unspecified chronicity M54.9 724.5 AMB POC URINALYSIS DIP STICK AUTO W/ MICRO      REFERRAL TO PHYSICAL THERAPY      baclofen (LIORESAL) 10 mg tablet

## 2017-07-18 NOTE — PROGRESS NOTES
ROOM # 9    Daniel Arita presents today for   Chief Complaint   Patient presents with    Side Pain     Left side; x3 mos    Back Pain       Lalo Linares III preferred language for health care discussion is english/other. Is someone accompanying this pt? no    Is the patient using any DME equipment during OV? no    Depression Screening:  PHQ over the last two weeks 7/18/2017 2/11/2017 2/11/2017 7/25/2016 1/25/2016 3/19/2015 4/23/2014   Little interest or pleasure in doing things Not at all Not at all Not at all Not at all Not at all Not at all Not at all   Feeling down, depressed or hopeless Not at all Not at all Not at all Not at all Not at all Not at all Not at all   Total Score PHQ 2 0 0 0 0 0 0 0       Learning Assessment:  Learning Assessment 1/29/2015 3/14/2014   PRIMARY LEARNER Patient Patient   HIGHEST LEVEL OF EDUCATION - PRIMARY LEARNER  SOME COLLEGE 2 YEARS [address] [facility] LEARNER NONE NONE   CO-LEARNER CAREGIVER - No   PRIMARY [address]    NEED - No   LEARNER PREFERENCE PRIMARY DEMONSTRATION DEMONSTRATION   LEARNING SPECIAL TOPICS - none   ANSWERED BY patient self   RELATIONSHIP SELF SELF       Abuse Screening:  Abuse Screening Questionnaire 4/16/2015   Do you ever feel afraid of your partner? N   Are you in a relationship with someone who physically or mentally threatens you? N   Is it safe for you to go home? Y       Health Maintenance reviewed and discussed per provider. Yes    Lalo Linares III is due for updated. Please order/place referral if appropriate. Advance Directive:  1. Do you have an advance directive in place? Patient Reply: no    2. If not, would you like material regarding how to put one in place? Patient Reply: no    Coordination of Care:  1. Have you been to the ER, urgent care clinic since your last visit? Hospitalized since your last visit? no    2.  Have you seen or consulted any other health care providers outside of the [facility] since your last visit? Include any pap smears or colon screening.  no

## 2017-07-18 NOTE — MR AVS SNAPSHOT
Visit Information Date & Time Provider Department Dept. Phone Encounter #  
 7/18/2017 10:15 AM [facility] [phone] [id number] Upcoming Health Maintenance Date Due INFLUENZA AGE 9 TO ADULT 8/1/2017 COLONOSCOPY 3/24/2019 DTaP/Tdap/Td series (2 - Td) 7/25/2026 Allergies as of 7/18/2017  Review Complete On: 7/18/2017 By: Renetta Smith LPN No Known Allergies Current Immunizations  Never Reviewed Name Date Tdap 7/25/2016  4:43 PM  
  
 Not reviewed this visit You Were Diagnosed With   
  
 Codes Comments Back pain, unspecified back location, unspecified back pain laterality, unspecified chronicity    -  Primary ICD-10-CM: M54.9 ICD-9-CM: 724.5 Vitals BP Pulse Temp Resp Height(growth percentile) Weight(growth percentile) (!) 144/91 (BP 1 Location: Left arm, BP Patient Position: Sitting) 76 98.3 °F (36.8 °C) (Oral) 18 5' 6\" (1.676 m) 198 lb 3.2 oz (89.9 kg) SpO2 BMI Smoking Status 98% 31.99 kg/m2 Former Smoker BMI and BSA Data Body Mass Index Body Surface Area 31.99 kg/m 2 2.05 m 2 Preferred Pharmacy Pharmacy Name Phone [facility] [address] [place] [address]. [address] [phone] Your Updated Medication List  
  
   
This list is accurate as of: 7/18/17 11:04 AM.  Always use your most recent med list.  
  
  
  
  
 acyclovir 400 mg tablet Commonly known as:  ZOVIRAX  
take 1 tablet by mouth daily  
  
 aspirin delayed-release 81 mg tablet Take 81 mg by mouth daily. baclofen 10 mg tablet Commonly known as:  LIORESAL Take 1 Tab by mouth three (3) times daily as needed. desonide 0.05 % topical ointment Commonly known as:  Harshad Blew Apply  to affected area two (2) times a day. flecainide 50 mg tablet Commonly known as:  TAMBOCOR 50 mg.  
  
 levothyroxine 88 mcg tablet Commonly known as:  SYNTHROID  
take 1 tablet by mouth once daily before BREAKFAST  
  
 tamsulosin 0.4 mg capsule Commonly known as:  FLOMAX Take 1 Cap by mouth daily. tobramycin 0.3 % ophthalmic ointment Commonly known as:  Lorre Fu Apply on affected area twice daily for 5 days. Prescriptions Sent to Pharmacy Refills  
 baclofen (LIORESAL) 10 mg tablet 1 Sig: Take 1 Tab by mouth three (3) times daily as needed. Class: Normal  
 Pharmacy: [facility] [address] [place] [address] [address]  #: [phone] Route: Oral  
  
We Performed the Following AMB POC URINALYSIS DIP STICK AUTO W/ MICRO [19786 CPT(R)] REFERRAL TO PHYSICAL THERAPY [TOK21 Custom] Comments:  
 Please evaluate patient for thoracolumbar back pain secondary to spasm Please schedule and authorize patient for services per PT rec Referral Information Referral ID Referred By Referred To  
  
 [id number] [facility] [address] Phone: [phone] Visits Status Start Date End Date 1 New Request 7/18/17 7/18/18 If your referral has a status of pending review or denied, additional information will be sent to support the outcome of this decision. Introducing [facility] & HEALTH SERVICES! Dear Silver Brown: 
Thank you for requesting a Actiwave account. Our records indicate that you already have an active Actiwave account. You can access your account anytime at [email or web address]/[email or web address] [employer] Did you know that you can access your hospital and ER discharge instructions at any time in Actiwave? You can also review all of your test results from your hospital stay or ER visit. Additional Information If you have questions, please visit the Frequently Asked Questions section of the Actiwave website at [email or web address]/. Remember, MyChart is NOT to be used for urgent needs. For medical emergencies, dial 911. Now available from your iPhone and Android! Please provide this summary of care documentation to your next provider. Your primary care clinician is listed as [facility]. If you have any questions after today's visit, please call [phone].

## 2018-02-23 ENCOUNTER — OFFICE VISIT (OUTPATIENT)
Dept: INTERNAL MEDICINE CLINIC | Age: 63
End: 2018-02-23

## 2018-02-23 VITALS
HEIGHT: 66 IN | DIASTOLIC BLOOD PRESSURE: 77 MMHG | BODY MASS INDEX: 32.3 KG/M2 | WEIGHT: 201 LBS | TEMPERATURE: 97.9 F | OXYGEN SATURATION: 96 % | SYSTOLIC BLOOD PRESSURE: 123 MMHG | HEART RATE: 58 BPM | RESPIRATION RATE: 15 BRPM

## 2018-02-23 DIAGNOSIS — E03.9 ACQUIRED HYPOTHYROIDISM: Primary | Chronic | ICD-10-CM

## 2018-02-23 DIAGNOSIS — Z51.81 MEDICATION MONITORING ENCOUNTER: ICD-10-CM

## 2018-02-23 DIAGNOSIS — I25.10 ASCVD (ARTERIOSCLEROTIC CARDIOVASCULAR DISEASE): Chronic | ICD-10-CM

## 2018-02-23 NOTE — ACP (ADVANCE CARE PLANNING)
Advance Directive:  1. Do you have an advance directive in place? Patient Reply: No     2. If not, would you like material regarding how to put one in place?  Patient Reply: No

## 2018-02-23 NOTE — MR AVS SNAPSHOT
[address] [address] [address] [facility] [address] 
[phone] Patient: Sumi Hamilton III 
MRN: [id number] Visit Information Date & Time Provider Department Dept. Phone Encounter #  
 2/23/2018 11:00 AM MD Karthikeyan Pe[facility] [address] [id number] Follow-up Instructions Return in about 6 months (around 8/23/2018) for htn, cholesterol. Upcoming Health Maintenance Date Due COLONOSCOPY 11/13/2020 DTaP/Tdap/Td series (2 - Td) 7/25/2026 Allergies as of 2/23/2018  Review Complete On: 2/23/2018 By: Victoria Cortez MD  
 No Known Allergies Current Immunizations  Never Reviewed Name Date Tdap 7/25/2016  4:43 PM  
  
 Not reviewed this visit You Were Diagnosed With   
  
 Codes Comments Acquired hypothyroidism    -  Primary ICD-10-CM: E03.9 ICD-9-CM: 244.9 ASCVD (arteriosclerotic cardiovascular disease)     ICD-10-CM: I25.10 ICD-9-CM: 429.2, 440.9 Medication monitoring encounter     ICD-10-CM: Z51.81 
ICD-9-CM: V58.83 Vitals BP Pulse Temp Resp Height(growth percentile) Weight(growth percentile) 123/77 (BP 1 Location: Left arm, BP Patient Position: Sitting) (!) 58 97.9 °F (36.6 °C) (Oral) 15 5' 6\" (1.676 m) 201 lb (91.2 kg) SpO2 BMI Smoking Status 96% 32.44 kg/m2 Former Smoker Vitals History BMI and BSA Data Body Mass Index Body Surface Area  
 32.44 kg/m 2 2.06 m 2 Preferred Pharmacy Pharmacy Name Phone [facility] [address] [place], [place] [address] [phone] Your Updated Medication List  
  
   
This list is accurate as of 2/23/18 11:40 AM.  Always use your most recent med list.  
  
  
  
  
 acyclovir 400 mg tablet Commonly known as:  ZOVIRAX  
take 1 tablet by mouth daily  
  
 aspirin delayed-release 81 mg tablet Take 81 mg by mouth daily. desonide 0.05 % topical ointment Commonly known as:  Mery Colder Apply  to affected area two (2) times a day. flecainide 50 mg tablet Commonly known as:  TAMBOCOR 50 mg.  
  
 levothyroxine 88 mcg tablet Commonly known as:  SYNTHROID  
take 1 tablet by mouth once daily before BREAKFAST  
  
 tamsulosin 0.4 mg capsule Commonly known as:  FLOMAX TAKE 1 CAPSULE BY MOUTH DAILY Follow-up Instructions Return in about 6 months (around 8/23/2018) for htn, cholesterol. To-Do List   
 02/23/2018 Lab:  CBC WITH AUTOMATED DIFF   
  
 02/23/2018 Lab:  LIPID PANEL   
  
 02/23/2018 Lab:  METABOLIC PANEL, COMPREHENSIVE   
  
 02/23/2018 Lab:  T3 TOTAL   
  
 02/23/2018 Lab:  TSH AND FREE T4 Introducing hospitals & [facility]! Dear Emily Stein: 
Thank you for requesting a Jiff account. Our records indicate that you already have an active Jiff account. You can access your account anytime at [email or web address] Did you know that you can access your hospital and ER discharge instructions at any time in Jiff? You can also review all of your test results from your hospital stay or ER visit. Additional Information If you have questions, please visit the Frequently Asked Questions section of the Jiff website at [email or web address] Remember, Jiff is NOT to be used for urgent needs. For medical emergencies, dial 911. Now available from your iPhone and Android! Please provide this summary of care documentation to your next provider. Your primary care clinician is listed as [facility]. If you have any questions after today's visit, please call [phone].

## 2018-02-23 NOTE — PROGRESS NOTES
HISTORY OF PRESENT ILLNESS  Jus Cruz III is a 57 y.o. male. Visit Vitals    /77 (BP 1 Location: Left arm, BP Patient Position: Sitting)    Pulse (!) 58    Temp 97.9 °F (36.6 °C) (Oral)    Resp 15    Ht 5' 6\" (1.676 m)    Wt 201 lb (91.2 kg)    SpO2 96%    BMI 32.44 kg/m2       HPI Comments: Pt had surgery 3 yrs ago and developed thyroid problems. He was referred to [facility] but is it not clear if he was ever seen there. We have no notes and pt is unclear (as he has seen so many doctors there). There is no available lab in the last 18 months    Just had another  Neck scan and it looked good. Thyroid Problem   The history is provided by the patient (see comments). This is a chronic problem. The current episode started more than 1 week ago. The problem occurs daily. The problem has not changed since onset. Review of Systems   Constitutional: Negative. Respiratory: Negative. Cardiovascular: Negative. Neurological: Negative. Physical Exam   Constitutional: He is oriented to person, place, and time. He appears well-developed and well-nourished. No distress. HENT:   Post surgical neck changes   Cardiovascular: Normal rate and regular rhythm. Pulmonary/Chest: Effort normal and breath sounds normal.   Musculoskeletal: He exhibits no edema. Neurological: He is alert and oriented to person, place, and time. Skin: Skin is warm and dry. He is not diaphoretic. Psychiatric: He has a normal mood and affect. Nursing note and vitals reviewed. ASSESSMENT and PLAN    ICD-10-CM ICD-9-CM    1. Acquired hypothyroidism E03.9 244.9 TSH AND FREE T4      T3 TOTAL   2. ASCVD (arteriosclerotic cardiovascular disease) H73.24 302.4 METABOLIC PANEL, COMPREHENSIVE     440.9 LIPID PANEL   3. Medication monitoring encounter K43.93 A96.30 METABOLIC PANEL, COMPREHENSIVE      CBC WITH AUTOMATED DIFF       Will update thyroid.  Will update other lab that could be affected by his thyroid and/or meds    Incidentally, Discussed BMI/weight, lifestyle, diet and exercise. Discussed effect on blood pressure, blood sugar, and joints especially  Focus on limiting white carbs, portion control, and moving more. He had lost a lot of weight when he had surgery 3 years ago when he could not eat. Now he has regained it.  Cautioned her regarding this    F/u 6 months

## 2018-02-23 NOTE — PROGRESS NOTES
Nubia Gilliland III presents today for thyroid. Nubia Gilliland III preferred language for health care discussion is english/other. Is someone accompanying this pt? No     Is the patient using any DME equipment during OV? No     Depression Screening:  PHQ over the last two weeks 7/18/2017 2/11/2017 2/11/2017 7/25/2016 1/25/2016 3/19/2015 4/23/2014   Little interest or pleasure in doing things Not at all Not at all Not at all Not at all Not at all Not at all Not at all   Feeling down, depressed or hopeless Not at all Not at all Not at all Not at all Not at all Not at all Not at all   Total Score PHQ 2 0 0 0 0 0 0 0       Learning Assessment:  Learning Assessment 1/29/2015 3/14/2014   PRIMARY LEARNER Patient Patient   HIGHEST LEVEL OF EDUCATION - PRIMARY LEARNER  SOME COLLEGE 2 YEARS [facility] CAREGIVER - No   PRIMARY [address]    NEED - No   LEARNER PREFERENCE PRIMARY DEMONSTRATION DEMONSTRATION   LEARNING SPECIAL TOPICS - none   ANSWERED BY patient self   RELATIONSHIP SELF SELF       Abuse Screening:  Abuse Screening Questionnaire 4/16/2015   Do you ever feel afraid of your partner? N   Are you in a relationship with someone who physically or mentally threatens you? N   Is it safe for you to go home? Y       Fall Risk  No flowsheet data found. Health Maintenance reviewed and discussed per provider. Yes; None due. Advance Directive:  1. Do you have an advance directive in place? Patient Reply: No     2. If not, would you like material regarding how to put one in place? Patient Reply: No     Coordination of Care:  1. Have you been to the ER, urgent care clinic since your last visit? Hospitalized since your last visit? No     2. Have you seen or consulted any other health care providers outside of the [address] [place] [address] since your last visit?  Dr Marianne Rao [facility])

## 2018-02-24 LAB
A-G RATIO,AGRAT: 1.7 RATIO (ref 1.1–2.6)
ABSOLUTE LYMPHOCYTE COUNT, 10803: 1.5 K/UL (ref 1–4.8)
ALBUMIN SERPL-MCNC: 4.3 G/DL (ref 3.5–5)
ALP SERPL-CCNC: 69 U/L (ref 40–125)
ALT SERPL-CCNC: 19 U/L (ref 5–40)
ANION GAP SERPL CALC-SCNC: 19 MMOL/L
AST SERPL W P-5'-P-CCNC: 23 U/L (ref 10–37)
BASOPHILS # BLD: 0.1 K/UL (ref 0–0.2)
BASOPHILS NFR BLD: 1 % (ref 0–2)
BILIRUB SERPL-MCNC: 0.4 MG/DL (ref 0.2–1.2)
BUN SERPL-MCNC: 15 MG/DL (ref 6–22)
CALCIUM SERPL-MCNC: 9.5 MG/DL (ref 8.4–10.4)
CHLORIDE SERPL-SCNC: 98 MMOL/L (ref 98–110)
CHOLEST SERPL-MCNC: 200 MG/DL (ref 110–200)
CO2 SERPL-SCNC: 23 MMOL/L (ref 20–32)
CREAT SERPL-MCNC: 0.9 MG/DL (ref 0.8–1.6)
EOSINOPHIL # BLD: 0.1 K/UL (ref 0–0.5)
EOSINOPHIL NFR BLD: 2 % (ref 0–6)
ERYTHROCYTE [DISTWIDTH] IN BLOOD BY AUTOMATED COUNT: 13.3 % (ref 10–16)
GFRAA, 66117: >60
GFRNA, 66118: >60
GLOBULIN,GLOB: 2.6 G/DL (ref 2–4)
GLUCOSE SERPL-MCNC: 99 MG/DL (ref 70–99)
GRANULOCYTES,GRANS: 63 % (ref 40–75)
HCT VFR BLD AUTO: 47.9 % (ref 39.3–51.6)
HDLC SERPL-MCNC: 3.8 MG/DL (ref 0–5)
HDLC SERPL-MCNC: 52 MG/DL (ref 40–59)
HGB BLD-MCNC: 16 G/DL (ref 13.1–17.2)
LDLC SERPL CALC-MCNC: 118 MG/DL (ref 50–99)
LYMPHOCYTES, LYMLT: 26 % (ref 27–45)
MCH RBC QN AUTO: 32 PG (ref 26–34)
MCHC RBC AUTO-ENTMCNC: 33 G/DL (ref 32–36)
MCV RBC AUTO: 95 FL (ref 80–95)
MONOCYTES # BLD: 0.5 K/UL (ref 0.1–0.9)
MONOCYTES NFR BLD: 8 % (ref 3–9)
NEUTROPHILS # BLD AUTO: 3.7 K/UL (ref 1.8–7.7)
PLATELET # BLD AUTO: 219 K/UL (ref 140–440)
PMV BLD AUTO: 10.7 FL (ref 6–10.8)
POTASSIUM SERPL-SCNC: 4.7 MMOL/L (ref 3.5–5.5)
PROT SERPL-MCNC: 6.9 G/DL (ref 6.2–8.1)
RBC # BLD AUTO: 5.02 M/UL (ref 3.8–5.8)
SODIUM SERPL-SCNC: 140 MMOL/L (ref 133–145)
T4 FREE SERPL-MCNC: 1.4 NG/DL (ref 0.9–1.8)
TRIGL SERPL-MCNC: 153 MG/DL (ref 40–149)
TSH SERPL DL<=0.005 MIU/L-ACNC: 1.6 MCU/ML (ref 0.27–4.2)
VLDLC SERPL CALC-MCNC: 31 MG/DL (ref 8–30)
WBC # BLD AUTO: 5.9 K/UL (ref 4–11)

## 2018-06-18 DIAGNOSIS — Z76.0 MEDICATION REFILL: ICD-10-CM

## 2018-06-18 DIAGNOSIS — E89.0 POSTOPERATIVE HYPOTHYROIDISM: ICD-10-CM

## 2018-06-18 RX ORDER — LEVOTHYROXINE SODIUM 88 UG/1
TABLET ORAL
Qty: 90 TAB | Refills: 5 | Status: SHIPPED | OUTPATIENT
Start: 2018-06-18 | End: 2019-06-23 | Stop reason: SDUPTHER

## 2018-07-16 DIAGNOSIS — Z76.0 MEDICATION REFILL: ICD-10-CM

## 2018-07-16 RX ORDER — TAMSULOSIN HYDROCHLORIDE 0.4 MG/1
CAPSULE ORAL
Qty: 90 CAP | Refills: 5 | Status: SHIPPED | OUTPATIENT
Start: 2018-07-16 | End: 2019-07-18 | Stop reason: SDUPTHER

## 2018-08-23 ENCOUNTER — OFFICE VISIT (OUTPATIENT)
Dept: INTERNAL MEDICINE CLINIC | Age: 63
End: 2018-08-23

## 2018-08-23 VITALS
HEIGHT: 66 IN | HEART RATE: 90 BPM | BODY MASS INDEX: 32.14 KG/M2 | RESPIRATION RATE: 20 BRPM | OXYGEN SATURATION: 97 % | TEMPERATURE: 97.5 F | WEIGHT: 200 LBS | DIASTOLIC BLOOD PRESSURE: 80 MMHG | SYSTOLIC BLOOD PRESSURE: 115 MMHG

## 2018-08-23 DIAGNOSIS — E03.9 ACQUIRED HYPOTHYROIDISM: Primary | Chronic | ICD-10-CM

## 2018-08-23 DIAGNOSIS — Z23 ENCOUNTER FOR IMMUNIZATION: ICD-10-CM

## 2018-08-23 DIAGNOSIS — I25.10 ASCVD (ARTERIOSCLEROTIC CARDIOVASCULAR DISEASE): Chronic | ICD-10-CM

## 2018-08-23 DIAGNOSIS — Z76.0 MEDICATION REFILL: ICD-10-CM

## 2018-08-23 RX ORDER — DESONIDE 0.5 MG/G
OINTMENT TOPICAL 2 TIMES DAILY
Qty: 30 G | Refills: 11 | Status: SHIPPED | OUTPATIENT
Start: 2018-08-23 | End: 2019-12-12 | Stop reason: SDUPTHER

## 2018-08-23 NOTE — PROGRESS NOTES
ROOM # 6    Latesha Cristobal presents today for   Chief Complaint   Patient presents with    Blood Pressure Check     6m f/u    Thyroid Problem       Aida Michel III preferred language for health care discussion is english/other. Is someone accompanying this pt? no    Is the patient using any DME equipment during OV? no    Depression Screening:  PHQ over the last two weeks 8/23/2018 7/18/2017 2/11/2017 2/11/2017 7/25/2016 1/25/2016 3/19/2015   Little interest or pleasure in doing things Not at all Not at all Not at all Not at all Not at all Not at all Not at all   Feeling down, depressed, irritable, or hopeless Not at all Not at all Not at all Not at all Not at all Not at all Not at all   Total Score PHQ 2 0 0 0 0 0 0 0       Learning Assessment:  Learning Assessment 1/29/2015 3/14/2014   PRIMARY LEARNER Patient Patient   HIGHEST LEVEL OF EDUCATION - PRIMARY LEARNER  SOME COLLEGE 2 YEARS [employer] LEARNER NONE NONE   CO-LEARNER CAREGIVER - No   PRIMARY [address]    NEED - No   LEARNER PREFERENCE PRIMARY DEMONSTRATION DEMONSTRATION   LEARNING SPECIAL TOPICS - none   ANSWERED BY patient self   RELATIONSHIP SELF SELF       Abuse Screening:  Abuse Screening Questionnaire 4/16/2015   Do you ever feel afraid of your partner? N   Are you in a relationship with someone who physically or mentally threatens you? N   Is it safe for you to go home? Y       Fall Risk  Fall Risk Assessment, last 12 mths 8/23/2018   Able to walk? Yes   Fall in past 12 months? No       Health Maintenance reviewed and discussed per provider. Yes    Aida Anand JAKE is due for   Health Maintenance Due   Topic Date Due    Influenza Age 5 to Adult  08/01/2018    GLAUCOMA SCREENING Q2Y  08/15/2018    Pneumococcal 65+ Low/Medium Risk (1 of 2 - PCV13) 08/15/2018     Please order/place referral if appropriate. Advance Directive:  1. Do you have an advance directive in place?  Patient Reply: no    2. If not, would you like material regarding how to put one in place? Patient Reply: no    Coordination of Care:  1. Have you been to the ER, urgent care clinic since your last visit? Hospitalized since your last visit? no    2. Have you seen or consulted any other health care providers outside of the [facility] since your last visit? Include any pap smears or colon screening. no      Immunization/s administered 8/23/2018 by Marlena Bedoya LPN with guardian's consent. Patient tolerated procedure well. No reactions noted.

## 2018-08-23 NOTE — MR AVS SNAPSHOT
[address] [place] [address] 
 
 
 [address] [facility] 83 [address] 
[phone] Patient: Vanessa Coronado III 
MRN: [id number] Visit Information Date & Time Provider Department Dept. Phone Encounter #  
 8/23/2018  3:30 PM [address] [phone] [id number] Follow-up Instructions Return in about 6 months (around 2/23/2019) for annual CPE, thryoid disorder. Upcoming Health Maintenance Date Due Influenza Age 5 to Adult 8/1/2018 GLAUCOMA SCREENING Q2Y 8/15/2018 Pneumococcal 65+ Low/Medium Risk (1 of 2 - PCV13) 8/15/2018 COLONOSCOPY 11/13/2020 DTaP/Tdap/Td series (2 - Td) 7/25/2026 Allergies as of 8/23/2018  Review Complete On: 8/23/2018 By: Tamika Hernandez MD  
 No Known Allergies Current Immunizations  Never Reviewed Name Date Pneumococcal Polysaccharide (PPSV-23)  Incomplete Tdap 7/25/2016  4:43 PM  
  
 Not reviewed this visit You Were Diagnosed With   
  
 Codes Comments Acquired hypothyroidism    -  Primary ICD-10-CM: E03.9 ICD-9-CM: 244.9 ASCVD (arteriosclerotic cardiovascular disease)     ICD-10-CM: I25.10 ICD-9-CM: 429.2, 440.9 Medication refill     ICD-10-CM: Z76.0 ICD-9-CM: V68.1 Encounter for immunization     ICD-10-CM: X66 ICD-9-CM: V03.89 Vitals BP Pulse Temp Resp Height(growth percentile) Weight(growth percentile) 115/80 (BP 1 Location: Right arm, BP Patient Position: Sitting) 90 97.5 °F (36.4 °C) (Oral) 20 5' 6\" (1.676 m) 200 lb (90.7 kg) SpO2 BMI Smoking Status 97% 32.28 kg/m2 Former Smoker Vitals History BMI and BSA Data Body Mass Index Body Surface Area  
 32.28 kg/m 2 2.06 m 2 Preferred Pharmacy Pharmacy Name Phone [address] [phone] Your Updated Medication List  
  
   
 This list is accurate as of 8/23/18  4:20 PM.  Always use your most recent med list.  
  
  
  
  
 acyclovir 400 mg tablet Commonly known as:  ZOVIRAX TAKE 1 TABLET BY MOUTH DAILY  
  
 aspirin delayed-release 81 mg tablet Take 81 mg by mouth daily. desonide 0.05 % topical ointment Commonly known as:  Ronald Lito Apply  to affected area two (2) times a day. flecainide 50 mg tablet Commonly known as:  TAMBOCOR 50 mg.  
  
 levothyroxine 88 mcg tablet Commonly known as:  SYNTHROID  
TAKE 1 TABLET BY MOUTH EVERY DAY BEFORE BREAKFAST  
  
 tamsulosin 0.4 mg capsule Commonly known as:  FLOMAX TAKE 1 CAPSULE BY MOUTH DAILY Prescriptions Sent to Pharmacy Refills  
 desonide (DESOWEN) 0.05 % topical ointment 11 Sig: Apply  to affected area two (2) times a day. Class: Normal  
 Pharmacy: [facility] [address] [place] [address] [address] Ph #: [phone] Route: Topical  
  
We Performed the Following PNEUMOCOCCAL POLYSACCHARIDE VACCINE, 23-VALENT, ADULT OR IMMUNOSUPPRESSED PT DOSE, [15915 CPT(R)] Follow-up Instructions Return in about 6 months (around 2/23/2019) for annual CPE, thryoid disorder. Introducing [facility] & HEALTH SERVICES! Dear Stoney Carreon: 
Thank you for requesting a Knip account. Our records indicate that you already have an active Knip account. You can access your account anytime at [email or web address] Did you know that you can access your hospital and ER discharge instructions at any time in Knip? You can also review all of your test results from your hospital stay or ER visit. Additional Information If you have questions, please visit the Frequently Asked Questions section of the Knip website at [email or web address] Remember, Knip is NOT to be used for urgent needs. For medical emergencies, dial 911. Now available from your iPhone and Android! Please provide this summary of care documentation to your next provider. Your primary care clinician is listed as [facility]. If you have any questions after today's visit, please call [phone].

## 2018-08-23 NOTE — PROGRESS NOTES
HISTORY OF PRESENT ILLNESS  Arlen Marinelli III is a 70 y.o. male. Visit Vitals    /80 (BP 1 Location: Right arm, BP Patient Position: Sitting)    Pulse 90    Temp 97.5 °F (36.4 °C) (Oral)    Resp 20    Ht 5' 6\" (1.676 m)    Wt 200 lb (90.7 kg)    SpO2 97%    BMI 32.28 kg/m2       HPI Comments: Feeling well today    Had lab last week      Thinking about retiring. 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 When has to walk a long time his sciatica acts up and it is getting harder to do that walking. Sometimes it is as long as several football fields. He had an area that he could park in, but now that is becoming a problem. The police suggested he get a handicapped plaquard. But he really, most of the time, does not need one. He does not really meet the criteria by the DMV. Thyroid Problem   The history is provided by the patient. This is a chronic problem. The current episode started more than 1 week ago. The problem occurs daily. The problem has not changed since onset. The symptoms are relieved by medications. Treatments tried: synthroid. The treatment provided significant relief. Blood Pressure Check   The history is provided by the patient. This is a recurrent problem. The current episode started more than 1 week ago. The problem occurs daily. The problem has been resolved. Review of Systems   Constitutional: Negative. Respiratory: Negative. Cardiovascular: Negative. Neurological: Negative. Physical Exam   Constitutional: He is oriented to person, place, and time. He appears well-developed and well-nourished. No distress. Cardiovascular: Normal rate and regular rhythm. Pulmonary/Chest: Effort normal and breath sounds normal.   Musculoskeletal: He exhibits no edema. Neurological: He is alert and oriented to person, place, and time. Skin: Skin is warm and dry. He is not diaphoretic. Psychiatric: He has a normal mood and affect.    Nursing note and vitals reviewed. ASSESSMENT and PLAN    ICD-10-CM ICD-9-CM    1. Acquired hypothyroidism E03.9 244.9    2. ASCVD (arteriosclerotic cardiovascular disease) I25.10 429.2      440.9    3. Medication refill Z76.0 V68.1 desonide (DESOWEN) 0.05 % topical ointment   4. Encounter for immunization Z23 V03.89 PNEUMOCOCCAL POLYSACCHARIDE VACCINE, 23-VALENT, ADULT OR IMMUNOSUPPRESSED PT DOSE,       BP fine    Reviewed last lab done 2 weeks ago. Thyroid stable. Refill as noted    Continue same    F/u 6 months. For annual CPE.

## 2019-03-13 ENCOUNTER — OFFICE VISIT (OUTPATIENT)
Dept: FAMILY MEDICINE CLINIC | Age: 64
End: 2019-03-13

## 2019-03-13 VITALS
BODY MASS INDEX: 32.47 KG/M2 | DIASTOLIC BLOOD PRESSURE: 87 MMHG | OXYGEN SATURATION: 97 % | HEART RATE: 100 BPM | WEIGHT: 202 LBS | HEIGHT: 66 IN | TEMPERATURE: 98.7 F | RESPIRATION RATE: 16 BRPM | SYSTOLIC BLOOD PRESSURE: 122 MMHG

## 2019-03-13 DIAGNOSIS — J06.9 UPPER RESPIRATORY TRACT INFECTION, UNSPECIFIED TYPE: Primary | ICD-10-CM

## 2019-03-13 DIAGNOSIS — J22 LRTI (LOWER RESPIRATORY TRACT INFECTION): ICD-10-CM

## 2019-03-13 RX ORDER — AMOXICILLIN AND CLAVULANATE POTASSIUM 875; 125 MG/1; MG/1
1 TABLET, FILM COATED ORAL EVERY 12 HOURS
Qty: 20 TAB | Refills: 0 | Status: SHIPPED | OUTPATIENT
Start: 2019-03-13 | End: 2019-03-27 | Stop reason: ALTCHOICE

## 2019-03-13 RX ORDER — BENZONATATE 200 MG/1
200 CAPSULE ORAL
Qty: 21 CAP | Refills: 0 | Status: SHIPPED | OUTPATIENT
Start: 2019-03-13 | End: 2019-03-20

## 2019-03-13 NOTE — PROGRESS NOTES
Patient presents to the clinic for a cough that began 2 weeks ago. Patient complains of sinus pressure.

## 2019-03-27 ENCOUNTER — OFFICE VISIT (OUTPATIENT)
Dept: FAMILY MEDICINE CLINIC | Age: 64
End: 2019-03-27

## 2019-03-27 VITALS
TEMPERATURE: 95.1 F | HEIGHT: 66 IN | SYSTOLIC BLOOD PRESSURE: 119 MMHG | WEIGHT: 197.6 LBS | HEART RATE: 84 BPM | BODY MASS INDEX: 31.76 KG/M2 | RESPIRATION RATE: 16 BRPM | DIASTOLIC BLOOD PRESSURE: 72 MMHG

## 2019-03-27 DIAGNOSIS — E03.9 ACQUIRED HYPOTHYROIDISM: Chronic | ICD-10-CM

## 2019-03-27 DIAGNOSIS — Z86.79 HISTORY OF ATRIAL FIBRILLATION: ICD-10-CM

## 2019-03-27 DIAGNOSIS — I44.7 LBBB (LEFT BUNDLE BRANCH BLOCK): ICD-10-CM

## 2019-03-27 DIAGNOSIS — R42 LIGHTHEADEDNESS: Primary | ICD-10-CM

## 2019-03-27 LAB — TROPONIN T, 140151: <0.01 NG/ML (ref 0–0.01)

## 2019-03-27 NOTE — PATIENT INSTRUCTIONS
Dizziness: Care Instructions  Your Care Instructions  Dizziness is the feeling of unsteadiness or fuzziness in your head. It is different than having vertigo, which is a feeling that the room is spinning or that you are moving or falling. It is also different from lightheadedness, which is the feeling that you are about to faint. It can be hard to know what causes dizziness. Some people feel dizzy when they have migraine headaches. Sometimes bouts of flu can make you feel dizzy. Some medical conditions, such as heart problems or high blood pressure, can make you feel dizzy. Many medicines can cause dizziness, including medicines for high blood pressure, pain, or anxiety. If a medicine causes your symptoms, your doctor may recommend that you stop or change the medicine. If it is a problem with your heart, you may need medicine to help your heart work better. If there is no clear reason for your symptoms, your doctor may suggest watching and waiting for a while to see if the dizziness goes away on its own. Follow-up care is a key part of your treatment and safety. Be sure to make and go to all appointments, and call your doctor if you are having problems. It's also a good idea to know your test results and keep a list of the medicines you take. How can you care for yourself at home? · If your doctor recommends or prescribes medicine, take it exactly as directed. Call your doctor if you think you are having a problem with your medicine. · Do not drive while you feel dizzy. · Try to prevent falls. Steps you can take include:  ? Using nonskid mats, adding grab bars near the tub, and using night-lights. ? Clearing your home so that walkways are free of anything you might trip on.  ? Letting family and friends know that you have been feeling dizzy. This will help them know how to help you. When should you call for help? Call 911 anytime you think you may need emergency care.  For example, call if:    · You passed out (lost consciousness).     · You have dizziness along with symptoms of a heart attack. These may include:  ? Chest pain or pressure, or a strange feeling in the chest.  ? Sweating. ? Shortness of breath. ? Nausea or vomiting. ? Pain, pressure, or a strange feeling in the back, neck, jaw, or upper belly or in one or both shoulders or arms. ? Lightheadedness or sudden weakness. ? A fast or irregular heartbeat.     · You have symptoms of a stroke. These may include:  ? Sudden numbness, tingling, weakness, or loss of movement in your face, arm, or leg, especially on only one side of your body. ? Sudden vision changes. ? Sudden trouble speaking. ? Sudden confusion or trouble understanding simple statements. ? Sudden problems with walking or balance. ? A sudden, severe headache that is different from past headaches.    Call your doctor now or seek immediate medical care if:    · You feel dizzy and have a fever, headache, or ringing in your ears.     · You have new or increased nausea and vomiting.     · Your dizziness does not go away or comes back.    Watch closely for changes in your health, and be sure to contact your doctor if:    · You do not get better as expected. Where can you learn more? Go to http://hilaria-jaswant.info/. Enter Q958 in the search box to learn more about \"Dizziness: Care Instructions. \"  Current as of: September 23, 2018  Content Version: 11.9  © 9242-3149 [employer]. Care instructions adapted under license by [employer] (which disclaims liability or warranty for this information). If you have questions about a medical condition or this instruction, always ask your healthcare professional. [place] [address] any warranty or liability for your use of this information.

## 2019-03-27 NOTE — PROGRESS NOTES
Derek Cardenas III is a 70 y.o. male and presents with Dizziness (lightheaded when he gets up. )       Subjective:    \"slight\" lightheadedness after getting out of bed a few days ago. Has gotten better but still \"feels strange\" - unable to describe further. No f/c. No n/v. No  CP. No sob. No diarrhea. No palpitations. No melena or hematochezia. no abd pain. No h/a. No paresthesia or weakness. No new medications except some augmentin about 2 weeks ago for \"a bad head cold\". Labs from february reviewed with pt. Sees cardiology for atrial fib in the past and LBBB - taking Tambocor. Assessment/Plan:      Diagnoses and all orders for this visit:    1. Lightheadedness- \"vague\" per pt. Will check lab/ECG/orthostatics today but suspect middle ear effusion given recent \"head cold\" and exam shows AF line on left TM but no erythema or active infection. Borderline orthostasis by systolic bp today. Pt asked to increase water intake until seen for follow up. He will go to the emergency room for any new symptoms or worsening  -     METABOLIC PANEL, BASIC; Future  -     CBC W/O DIFF; Future  -     TSH 3RD GENERATION; Future  -     AMB POC EKG ROUTINE W/ 12 LEADS, INTER & REP  -     TROPONIN I; Future    2. Acquired hypothyroidism-rechecking TSH but last was normal  -     TSH 3RD GENERATION; Future    3. History of atrial fibrillation-regular rhythm today EKG shows sinus rhythm  -     METABOLIC PANEL, BASIC; Future  -     CBC W/O DIFF; Future  -     TSH 3RD GENERATION; Future  -     TROPONIN I; Future    4. LBBB (left bundle branch block)-has follow-up with cardiology  -     METABOLIC PANEL, BASIC; Future  -     CBC W/O DIFF; Future  -     TSH 3RD GENERATION; Future  -     TROPONIN I; Future        ECG done today shows sinus rhythm and LBBB. RTC prn.    Orders Placed This Encounter    METABOLIC PANEL, BASIC     Standing Status:   Future     Number of Occurrences:   1     Standing Expiration Date:   3/27/2020   Oni Loyd CBC W/O DIFF     Standing Status:   Future     Number of Occurrences:   1     Standing Expiration Date:   3/27/2020    TSH 3RD GENERATION     Standing Status:   Future     Number of Occurrences:   1     Standing Expiration Date:   3/27/2020    TROPONIN I     Standing Status:   Future     Number of Occurrences:   1     Standing Expiration Date:   3/27/2020    AMB POC EKG ROUTINE W/ 12 LEADS, INTER & REP     Order Specific Question:   Reason for Exam:     Answer:   lightheadedness               ROS:  Negative except as mentioned above  Cardiac- no chest pain or palpitations  Pulmonary- no sob or wheezes  GI- no n/v or diarrhea. SH:  Social History     Tobacco Use    Smoking status: Former Smoker     Types: Pipe    Smokeless tobacco: Never Used   Substance Use Topics    Alcohol use: No     Comment: 12/21/1991 stopped    Drug use: No         Medications/Allergies:  Current Outpatient Medications on File Prior to Visit   Medication Sig Dispense Refill    acyclovir (ZOVIRAX) 400 mg tablet TAKE 1 TABLET BY MOUTH DAILY 30 Tab 5    desonide (DESOWEN) 0.05 % topical ointment Apply  to affected area two (2) times a day. 30 g 11    tamsulosin (FLOMAX) 0.4 mg capsule TAKE 1 CAPSULE BY MOUTH DAILY 90 Cap 5    levothyroxine (SYNTHROID) 88 mcg tablet TAKE 1 TABLET BY MOUTH EVERY DAY BEFORE BREAKFAST 90 Tab 5    flecainide (TAMBOCOR) 50 mg tablet Take 50 mg by mouth two (2) times a day.  aspirin delayed-release 81 mg tablet Take 81 mg by mouth daily. No current facility-administered medications on file prior to visit. No Known Allergies    Objective:  Visit Vitals  /72   Pulse 84   Temp 95.1 °F (35.1 °C) (Oral)   Resp 16   Ht 5' 6\" (1.676 m)   Wt 197 lb 9.6 oz (89.6 kg)   BMI 31.89 kg/m²    Body mass index is 31.89 kg/m². Constitutional: Well developed, nourished, no distress, alert   HENT: Exterior ears and tympanic membranes normal bilaterally. Supple neck.  No thyromegaly or lymphadenopathy. Oropharynx clear and moist mucous membranes. Eyes: Conjunctiva normal. PERRL. CV: S1, S2.  RRR. No murmurs/rubs. No thrills palpated. No carotid bruits. Intact distal pulses. No edema. Pulm: No abnormalities on inspection. Clear to auscultation bilaterally. No wheezing/rhonchi. Normal effort. GI: Soft, nontender, nondistended. Normal active bowel sounds. No  masses on palpation. No hepatosplenomegaly.

## 2019-03-27 NOTE — PROGRESS NOTES
1. Have you been to the ER, urgent care clinic since your last visit? Hospitalized since your last visit? No.     2. Have you seen or consulted any other health care providers outside of the [address] [place] [address] since your last visit? Include any pap smears or colon screening. No.     Chief Complaint   Patient presents with    Dizziness     lightheaded when he gets up.       Orthostatic Blood Pressure Reading:  Sittin/90 Pulse 85  Standin/85 Pulse: 92  Laying down: 137/84 Pulse 86

## 2019-03-28 LAB
ANION GAP SERPL CALC-SCNC: 17 MMOL/L
BUN SERPL-MCNC: 14 MG/DL (ref 6–22)
CALCIUM SERPL-MCNC: 9.7 MG/DL (ref 8.4–10.4)
CHLORIDE SERPL-SCNC: 98 MMOL/L (ref 98–110)
CO2 SERPL-SCNC: 25 MMOL/L (ref 20–32)
CREAT SERPL-MCNC: 0.9 MG/DL (ref 0.8–1.6)
ERYTHROCYTE [DISTWIDTH] IN BLOOD BY AUTOMATED COUNT: 13.7 % (ref 10–15.5)
GFRAA, 66117: >60
GFRNA, 66118: >60
GLUCOSE SERPL-MCNC: 93 MG/DL (ref 70–99)
HCT VFR BLD AUTO: 44.6 % (ref 37.8–52.2)
HGB BLD-MCNC: 15 G/DL (ref 12.6–17.1)
MCH RBC QN AUTO: 31 PG (ref 26–34)
MCHC RBC AUTO-ENTMCNC: 34 G/DL (ref 31–36)
MCV RBC AUTO: 93 FL (ref 80–95)
PLATELET # BLD AUTO: 233 K/UL (ref 140–440)
PMV BLD AUTO: 10.4 FL (ref 9–13)
POTASSIUM SERPL-SCNC: 4.9 MMOL/L (ref 3.5–5.5)
RBC # BLD AUTO: 4.79 M/UL (ref 3.8–5.8)
SODIUM SERPL-SCNC: 140 MMOL/L (ref 133–145)
TSH SERPL DL<=0.005 MIU/L-ACNC: 1.1 MCU/ML (ref 0.27–4.2)
WBC # BLD AUTO: 7.3 K/UL (ref 4–11)

## 2019-04-25 ENCOUNTER — OFFICE VISIT (OUTPATIENT)
Dept: INTERNAL MEDICINE CLINIC | Age: 64
End: 2019-04-25

## 2019-04-25 VITALS
WEIGHT: 198 LBS | BODY MASS INDEX: 31.82 KG/M2 | SYSTOLIC BLOOD PRESSURE: 107 MMHG | HEART RATE: 84 BPM | TEMPERATURE: 98.1 F | OXYGEN SATURATION: 95 % | DIASTOLIC BLOOD PRESSURE: 75 MMHG | RESPIRATION RATE: 18 BRPM | HEIGHT: 66 IN

## 2019-04-25 DIAGNOSIS — Z00.00 ROUTINE GENERAL MEDICAL EXAMINATION AT A HEALTH CARE FACILITY: Primary | ICD-10-CM

## 2019-04-25 DIAGNOSIS — Z00.00 ROUTINE GENERAL MEDICAL EXAMINATION AT A HEALTH CARE FACILITY: ICD-10-CM

## 2019-04-25 DIAGNOSIS — E78.5 DYSLIPIDEMIA: ICD-10-CM

## 2019-04-25 DIAGNOSIS — Z12.5 SPECIAL SCREENING FOR MALIGNANT NEOPLASM OF PROSTATE: ICD-10-CM

## 2019-04-25 NOTE — PROGRESS NOTES
ROOM # 5 Severiano Economy III presents today for Chief Complaint Patient presents with  Complete Physical  
 
 
Severiano Economy III preferred language for health care discussion is english/other. Is someone accompanying this pt? no 
 
Is the patient using any DME equipment during [address]? no 
 
Depression Screening: 
3 most recent [facility] Screens 3/13/2019 8/23/2018 7/18/2017 2/11/2017 2/11/2017 7/25/2016 1/25/2016 Little interest or pleasure in doing things Not at all Not at all Not at all Not at all Not at all Not at all Not at all Feeling down, depressed, irritable, or hopeless Not at all Not at all Not at all Not at all Not at all Not at all Not at all Total Score PHQ 2 0 0 0 0 0 0 0 Learning Assessment: 
Learning Assessment 1/29/2015 3/14/2014 PRIMARY LEARNER Patient Patient HIGHEST LEVEL OF EDUCATION - PRIMARY LEARNER  SOME COLLEGE 2 YEARS OF COLLEGE  
BARRIERS PRIMARY LEARNER NONE NONE  
CO-LEARNER CAREGIVER - No  
PRIMARY LANGUAGE ENGLISH ENGLISH  NEED - No  
LEARNER PREFERENCE PRIMARY DEMONSTRATION DEMONSTRATION  
LEARNING SPECIAL TOPICS - none ANSWERED BY patient self RELATIONSHIP SELF SELF Abuse Screening: 
Abuse Screening Questionnaire 4/16/2015 Do you ever feel afraid of your partner? Severiano Organ Are you in a relationship with someone who physically or mentally threatens you? Severiano Organ Is it safe for you to go home? Jon Villafana Fall Risk Fall Risk Assessment, last 12 mths 3/13/2019 8/23/2018 Able to walk? Yes Yes Fall in past 12 months? No No  
 
 
Health Maintenance reviewed and discussed per provider. Yes Severiano Economy III is due for Health Maintenance Due Topic Date Due  Shingrix Vaccine Age 50> (1 of 2) 08/15/2003  GLAUCOMA SCREENING Q2Y  08/15/2018 Please order/place referral if appropriate. Advance Directive: 1. Do you have an advance directive in place?  Patient Reply: no 
 
 2. If not, would you like material regarding how to put one in place? Patient Reply: no 
 
Coordination of Care: 1. Have you been to the ER, urgent care clinic since your last visit? Hospitalized since your last visit? no 
 
2. Have you seen or consulted any other health care providers outside of the [address] [place] [address] since your last visit? Include any pap smears or colon screening.  no

## 2019-04-25 NOTE — PROGRESS NOTES
HISTORY OF PRESENT ILLNESS Reina Salazar III is a 70 y.o. male. Visit Vitals /75 (BP 1 Location: Right arm, BP Patient Position: Sitting) Pulse 84 Temp 98.1 °F (36.7 °C) (Oral) Resp 18 Ht 5' 6\" (1.676 m) Wt 198 lb (89.8 kg) SpO2 95% BMI 31.96 kg/m² Complete Physical  
The history is provided by the patient. This is a new problem. Pertinent negatives include no chest pain and no shortness of breath. Review of Systems Constitutional: Negative. HENT: Negative for congestion and sore throat. Respiratory: Negative for cough and shortness of breath. Cardiovascular: Negative for chest pain and palpitations. Sometimes gets woozy after climbing a flight of steps. No other associated sxs at this time Gastrointestinal: Negative. Genitourinary: Negative. Musculoskeletal: Negative. Neurological: Positive for dizziness (intermittent but better than when seen by Dr. Cholo Davenport in March). Weakness:  
  
 
 
Physical Exam  
Constitutional: He is oriented to person, place, and time. He appears well-developed and well-nourished. He does not have a sickly appearance. He does not appear ill. No distress. HENT:  
Head: Normocephalic and atraumatic. Right Ear: External ear and ear canal normal. No drainage or tenderness. No mastoid tenderness. Tympanic membrane is not erythematous. Left Ear: External ear and ear canal normal. No drainage or tenderness. No mastoid tenderness. Tympanic membrane is not erythematous. Nose: Nose normal. No mucosal edema, rhinorrhea or sinus tenderness. No epistaxis. Right sinus exhibits no maxillary sinus tenderness and no frontal sinus tenderness. Left sinus exhibits no maxillary sinus tenderness and no frontal sinus tenderness. Mouth/Throat: Uvula is midline, oropharynx is clear and moist and mucous membranes are normal. No oropharyngeal exudate, posterior oropharyngeal edema or posterior oropharyngeal erythema. Eyes: Pupils are equal, round, and reactive to light. Conjunctivae and EOM are normal. Right eye exhibits no discharge. Left eye exhibits no discharge. No scleral icterus. Neck: Normal range of motion and phonation normal. Neck supple. No JVD present. Carotid bruit is not present. No tracheal deviation and no edema present. No thyroid mass and no thyromegaly present. Cardiovascular: Normal rate, regular rhythm, normal heart sounds and intact distal pulses. Exam reveals no gallop and no friction rub. No murmur heard. Pulmonary/Chest: Effort normal and breath sounds normal. No respiratory distress. He has no wheezes. He has no rales. He exhibits no tenderness. Abdominal: Soft. Bowel sounds are normal. He exhibits no distension, no abdominal bruit, no pulsatile midline mass and no mass. There is no hepatosplenomegaly. There is no tenderness. There is no rebound, no guarding and no CVA tenderness. Hernia confirmed negative in the right inguinal area and confirmed negative in the left inguinal area. Genitourinary: Rectum normal and penis normal.  
 
 
Circumcised. Genitourinary Comments: Not able to feel prostate. Musculoskeletal: Normal range of motion. He exhibits no edema or tenderness. Lymphadenopathy:  
  He has no cervical adenopathy. Neurological: He is alert and oriented to person, place, and time. He has normal reflexes. He displays no tremor. No cranial nerve deficit. He exhibits normal muscle tone. Coordination normal.  
 
  
Skin: Skin is warm and dry. No rash noted. He is not diaphoretic. No cyanosis or erythema. Nails show no clubbing. Psychiatric: He has a normal mood and affect. His behavior is normal. Judgment and thought content normal.  
Nursing note and vitals reviewed. ASSESSMENT and PLAN 
  ICD-10-CM ICD-9-CM 1. Routine general medical examination at a health care facility Z00.00 V70.0 LIPID PANEL 2.  Special screening for malignant neoplasm of prostate Z12.5 V76.44 PSA SCREENING (SCREENING) 3. Dyslipidemia E78.5 272.4 LIPID PANEL Update portion of lab not done in March Otherwise doing well. Dizziness/vertigo has gotten better supporting probable inner ear dysfunction, most likely due to URI. Advised to have eye exam this year F/u 6 months

## 2019-04-26 LAB
CHOLEST SERPL-MCNC: 189 MG/DL (ref 110–200)
HDLC SERPL-MCNC: 4 MG/DL (ref 0–5)
HDLC SERPL-MCNC: 47 MG/DL (ref 40–59)
LDLC SERPL CALC-MCNC: 104 MG/DL (ref 50–99)
PSA SERPL-MCNC: 3.5 NG/ML
TRIGL SERPL-MCNC: 192 MG/DL (ref 40–149)
VLDLC SERPL CALC-MCNC: 38 MG/DL (ref 8–30)

## 2019-08-16 ENCOUNTER — OFFICE VISIT (OUTPATIENT)
Dept: INTERNAL MEDICINE CLINIC | Age: 64
End: 2019-08-16

## 2019-08-16 VITALS
TEMPERATURE: 96.6 F | SYSTOLIC BLOOD PRESSURE: 110 MMHG | BODY MASS INDEX: 31.34 KG/M2 | OXYGEN SATURATION: 98 % | HEART RATE: 69 BPM | WEIGHT: 195 LBS | RESPIRATION RATE: 20 BRPM | HEIGHT: 66 IN | DIASTOLIC BLOOD PRESSURE: 72 MMHG

## 2019-08-16 DIAGNOSIS — N50.819 TESTICULAR PAIN, UNSPECIFIED: Primary | ICD-10-CM

## 2019-08-16 LAB
BILIRUB UR QL: NEGATIVE
GLUCOSE UR QL: NEGATIVE MG/DL
HGB UR QL STRIP: NEGATIVE
KETONES UR QL STRIP.AUTO: NEGATIVE MG/DL
LEUKOCYTE ESTERASE: NEGATIVE
NITRITE UR QL STRIP.AUTO: NEGATIVE
PH UR STRIP: 9 PH (ref 5–8)
PROT UR QL STRIP: ABNORMAL MG/DL
RBC #/AREA URNS HPF: ABNORMAL /HPF
SP GR UR: 1.02 (ref 1–1.03)
UROBILINOGEN UR STRIP-MCNC: <2 MG/DL
WBC URNS QL MICRO: ABNORMAL /HPF (ref 0–2)

## 2019-08-16 NOTE — PROGRESS NOTES
HISTORY OF PRESENT ILLNESS  Kelvin Lucia III is a 75 y.o. male. Visit Vitals  /72 (BP 1 Location: Left arm, BP Patient Position: Sitting)   Pulse 69   Temp 96.6 °F (35.9 °C) (Oral)   Resp 20   Ht 5' 6\" (1.676 m)   Wt 195 lb (88.5 kg)   SpO2 98%   BMI 31.47 kg/m²       States his testicles are often sensitive. But his right seems a bit unusual.  He has not felt any masses. Nothing else seems unusual.   Last note from April exam indicated some tenderness    He reports even when younger her occasionally had pain during vigorous sex      Review of Systems   Constitutional: Negative. Genitourinary:        Testicular discomfort       Physical Exam   Constitutional: He appears well-developed and well-nourished. No distress. Genitourinary: Right testis shows no mass, no swelling and no tenderness. Circumcised. Skin: He is not diaphoretic. Nursing note and vitals reviewed. ASSESSMENT and PLAN    ICD-10-CM ICD-9-CM    1. Testicular pain, unspecified N50.819 608.9 US SCROTUM/TESTICLES      URINALYSIS W/ RFLX MICROSCOPIC       Will ultrasound his testicles. If negative, will reassure, If not sure, will refer to urology. Will check urine as occasionally he has cloudy urine. F/u prn or as appointed.

## 2019-08-16 NOTE — PROGRESS NOTES
ROOM # 4    Cyndie De Los Santos presents today for   Chief Complaint   Patient presents with    Testicle Pain     pt reports that something doesnt feel right, like something is there       Kale Gaviria III preferred language for health care discussion is english/other. Is someone accompanying this pt? no    Is the patient using any DME equipment during OV? no    Depression Screening:  3 most recent [facility] Screens 3/13/2019 8/23/2018 7/18/2017 2/11/2017 2/11/2017 7/25/2016 1/25/2016   Little interest or pleasure in doing things Not at all Not at all Not at all Not at all Not at all Not at all Not at all   Feeling down, depressed, irritable, or hopeless Not at all Not at all Not at all Not at all Not at all Not at all Not at all   Total Score PHQ 2 0 0 0 0 0 0 0       Learning Assessment:  Learning Assessment 1/29/2015 3/14/2014   PRIMARY LEARNER Patient Patient   HIGHEST LEVEL OF EDUCATION - PRIMARY LEARNER  SOME COLLEGE 2 YEARS [address] [employer] LEARNER NONE NONE   CO-LEARNER CAREGIVER - No   PRIMARY [address]    NEED - No   LEARNER PREFERENCE PRIMARY DEMONSTRATION DEMONSTRATION   LEARNING SPECIAL TOPICS - none   ANSWERED BY patient self   RELATIONSHIP SELF SELF       Abuse Screening:  Abuse Screening Questionnaire 4/16/2015   Do you ever feel afraid of your partner? N   Are you in a relationship with someone who physically or mentally threatens you? N   Is it safe for you to go home? Y       Fall Risk  Fall Risk Assessment, last 12 mths 3/13/2019 8/23/2018   Able to walk? Yes Yes   Fall in past 12 months? No No           Health Maintenance reviewed and discussed per provider. Yes    Kale Ades III is due for   Health Maintenance Due   Topic Date Due    Shingrix Vaccine Age 50> (1 of 2) 08/15/2003    GLAUCOMA SCREENING Q2Y  08/15/2018    Influenza Age 9 to Adult  08/01/2019     Please order/place referral if appropriate. Advance Directive:  1.  Do you have an advance directive in place? Patient Reply: no    2. If not, would you like material regarding how to put one in place? Patient Reply: no    Coordination of Care:  1. Have you been to the ER, urgent care clinic since your last visit? Hospitalized since your last visit? yes    2. Have you seen or consulted any other health care providers outside of the [address] [place] [address] since your last visit? Include any pap smears or colon screening.  Dr. Amber Latif

## 2019-08-28 ENCOUNTER — TELEPHONE (OUTPATIENT)
Dept: INTERNAL MEDICINE CLINIC | Age: 64
End: 2019-08-28

## 2019-08-28 NOTE — TELEPHONE ENCOUNTER
Please advise pt that his ultrasound (testicular) came back normal. Nothing to explain the occasional pain.  No masses or abnormal blood vessels, etc.

## 2019-08-29 NOTE — TELEPHONE ENCOUNTER
Attempted to contact pt at  number, no answer. m for pt to return call to office at [phone] . Will continue to try to contact pt.

## 2019-09-09 DIAGNOSIS — N50.819 TESTICULAR PAIN, UNSPECIFIED: ICD-10-CM

## 2019-10-30 ENCOUNTER — CLINICAL SUPPORT (OUTPATIENT)
Dept: INTERNAL MEDICINE CLINIC | Age: 64
End: 2019-10-30

## 2019-10-30 DIAGNOSIS — Z23 ENCOUNTER FOR IMMUNIZATION: Primary | ICD-10-CM

## 2020-06-14 DIAGNOSIS — Z76.0 MEDICATION REFILL: ICD-10-CM

## 2020-06-14 RX ORDER — ACYCLOVIR 400 MG/1
TABLET ORAL
Qty: 30 TAB | Refills: 5 | Status: SHIPPED | OUTPATIENT
Start: 2020-06-14 | End: 2020-12-07

## 2020-10-14 DIAGNOSIS — Z76.0 MEDICATION REFILL: ICD-10-CM

## 2020-10-14 RX ORDER — TAMSULOSIN HYDROCHLORIDE 0.4 MG/1
CAPSULE ORAL
Qty: 90 CAP | Refills: 4 | Status: SHIPPED | OUTPATIENT
Start: 2020-10-14 | End: 2022-01-02

## 2020-12-06 DIAGNOSIS — Z76.0 MEDICATION REFILL: ICD-10-CM

## 2020-12-07 RX ORDER — ACYCLOVIR 400 MG/1
TABLET ORAL
Qty: 30 TAB | Refills: 5 | Status: SHIPPED | OUTPATIENT
Start: 2020-12-07 | End: 2021-05-31 | Stop reason: SDUPTHER

## 2021-05-31 DIAGNOSIS — Z76.0 MEDICATION REFILL: ICD-10-CM

## 2021-05-31 RX ORDER — ACYCLOVIR 400 MG/1
TABLET ORAL
Qty: 30 TABLET | Refills: 5 | Status: SHIPPED | OUTPATIENT
Start: 2021-05-31 | End: 2021-12-02

## 2021-06-22 ENCOUNTER — OFFICE VISIT (OUTPATIENT)
Dept: INTERNAL MEDICINE CLINIC | Age: 66
End: 2021-06-22
Payer: COMMERCIAL

## 2021-06-22 VITALS
HEART RATE: 75 BPM | BODY MASS INDEX: 31.5 KG/M2 | TEMPERATURE: 97.6 F | RESPIRATION RATE: 18 BRPM | SYSTOLIC BLOOD PRESSURE: 144 MMHG | WEIGHT: 196 LBS | HEIGHT: 66 IN | OXYGEN SATURATION: 96 % | DIASTOLIC BLOOD PRESSURE: 86 MMHG

## 2021-06-22 DIAGNOSIS — K64.0 GRADE I HEMORRHOIDS: Primary | ICD-10-CM

## 2021-06-22 PROCEDURE — 99212 OFFICE O/P EST SF 10 MIN: CPT | Performed by: INTERNAL MEDICINE

## 2021-06-22 NOTE — PROGRESS NOTES
Progress Note    Patient: Byron Haq III               Sex: male                  YOB: 1955      Age:  77 y.o.                    HPI:     Byron Haq III is a 77 y.o. male who has been seen for anal  discomfort . Like a vibration  , he could feel something in shower . He did not palpate  anything . He feels that it has subsided now . Past Medical History:   Diagnosis Date    Abscess and cellulitis 8/3/2015    Acquired hypothyroidism 1/11/2016    Adenomatous colon polyp 12/2020    Arrhythmia     Bowen's disease     abdomen    BPH (benign prostatic hyperplasia)     Cancer (HCC)     bowens disease, abdomen    Colon polyps 11/13/2017    ascending colon, 4, adenomatous    Genital herpes     Scoliosis     Sigmoid polyp 3/24/16    Squamous cell carcinoma of mouth (HCC)        Past Surgical History:   Procedure Laterality Date    HX COLONOSCOPY  10/15/2012    Dr Anna Kapoor, polyps, 3 yr follow up    HX COLONOSCOPY  12/2020    3 yr f/u due to adenomatous polyps. Dr. Justino Perez. Jono Jones    HX HERNIA REPAIR Left 04/2015    HX OTHER SURGICAL  3/24/16    sigmoidoscopy, Dr. Justino Perez. Jono Jones    HX OTHER SURGICAL  7/15/2014    radical neck, [facility]    HX OTHER SURGICAL  08/05/2016    sigmoidoscopy, Dr. Amelia Mathews  cardiac cath    ?  2005       Family History   Problem Relation Age of Onset    Cancer Mother     Heart Disease Father     Cancer Paternal Uncle        Social History     Socioeconomic History    Marital status: SINGLE     Spouse name: Not on file    Number of children: Not on file    Years of education: Not on file    Highest education level: Not on file   Occupational History    Occupation: [employer] for [employer]     Employer: UNKNOWN   Tobacco Use    Smoking status: Former Smoker     Types: Pipe    Smokeless tobacco: Never Used   Substance and Sexual Activity    Alcohol use: No     Comment: 12/21/1991 stopped    Drug use: No    Sexual activity: Not Currently     Social Determinants of Health     Financial Resource Strain:     Difficulty of Paying Living Expenses:    Food Insecurity:     Worried About Running Out of Food in the Last Year:     [address] in the Last Year:    Transportation Needs:     Lack of Transportation (Medical):  Lack of Transportation (Non-Medical):    Physical Activity:     Days of Exercise per Week:     Minutes of Exercise per Session:    Stress:     Feeling of Stress :    Social Connections:     Frequency of Communication with Friends and Family:     Frequency of Social Gatherings with Friends and Family:     Attends Sikhism Services:     Active Member of Clubs or Organizations:     Attends Club or Organization Meetings:     Marital Status:          Current Outpatient Medications:     acyclovir (ZOVIRAX) 400 mg tablet, TAKE 1 TABLET BY MOUTH DAILY, Disp: 30 Tablet, Rfl: 5    levothyroxine (SYNTHROID) 88 mcg tablet, TAKE 1 TABLET BY MOUTH EVERY DAY BEFORE BREAKFAST, Disp: 90 Tab, Rfl: 5    tamsulosin (FLOMAX) 0.4 mg capsule, TAKE 1 CAPSULE BY MOUTH DAILY, Disp: 90 Cap, Rfl: 4    desonide (DESOWEN) 0.05 % topical ointment, APPLY EXTERNALLY TO THE AFFECTED AREA TWICE DAILY, Disp: 30 g, Rfl: 5    flecainide (TAMBOCOR) 50 mg tablet, Take 50 mg by mouth two (2) times a day., Disp: , Rfl:     aspirin delayed-release 81 mg tablet, Take 81 mg by mouth daily. , Disp: , Rfl:      No Known Allergies    Review of Systems   Constitutional: Negative for chills and fever. Eyes: Negative. Respiratory: Negative for cough and shortness of breath. Cardiovascular: Negative for chest pain. Gastrointestinal: Negative. Genitourinary: Negative. Neurological: Negative.          Physical Exam:      Visit Vitals  BP (!) 144/86 (BP 1 Location: Right upper arm, BP Patient Position: Sitting, BP Cuff Size: Adult)   Pulse 75   Temp 97.6 °F (36.4 °C) (Temporal) Resp 18   Ht 5' 6\" (1.676 m)   Wt 196 lb (88.9 kg)   SpO2 96%   BMI 31.64 kg/m²       Physical Exam  Constitutional:       Appearance: Normal appearance. Cardiovascular:      Rate and Rhythm: Normal rate and regular rhythm. Pulmonary:      Effort: Pulmonary effort is normal. No respiratory distress. Breath sounds: Normal breath sounds. No stridor. No wheezing, rhonchi or rales. Genitourinary:     Prostate: Normal.      Rectum: Guaiac result negative. Comments: Possible small hemorrhoid  Neurological:      Mental Status: He is alert.           Labs Reviewed:      Assessment/Plan       ICD-10-CM ICD-9-CM    1. Grade I hemorrhoids  K64.0 455.0    reassured , no Rx needed , continue miralax and try to keep regular without straining to have a BM           Tracy Zurita MD

## 2021-06-22 NOTE — PROGRESS NOTES
Sadia Mcneil III is a 77 y.o. male (: 1955) presenting to address:    Chief Complaint   Patient presents with    Anal Irritation       Vitals:    21 1620   BP: (!) 144/86   Pulse: 75   Resp: 18   Temp: 97.6 °F (36.4 °C)   TempSrc: Temporal   SpO2: 96%   Weight: 196 lb (88.9 kg)   Height: 5' 6\" (1.676 m)   PainSc:   0 - No pain       Hearing/Vision:   No exam data present    Learning Assessment:     Learning Assessment 2015   PRIMARY LEARNER Patient   HIGHEST LEVEL OF EDUCATION - PRIMARY LEARNER  SOME COLLEGE   BARRIERS PRIMARY LEARNER NONE   CO-LEARNER CAREGIVER -   PRIMARY LANGUAGE ENGLISH    NEED -   LEARNER PREFERENCE PRIMARY DEMONSTRATION   LEARNING SPECIAL TOPICS -   ANSWERED BY patient   RELATIONSHIP SELF     Depression Screening:     3 most recent PHQ Screens 2021   Little interest or pleasure in doing things Not at all   Feeling down, depressed, irritable, or hopeless Not at all   Total Score PHQ 2 0     Fall Risk Assessment:     Fall Risk Assessment, last 12 mths 2021   Able to walk? Yes   Fall in past 12 months? 0     Abuse Screening:     Abuse Screening Questionnaire 2015   Do you ever feel afraid of your partner? N   Are you in a relationship with someone who physically or mentally threatens you? N   Is it safe for you to go home? Y     Coordination of Care Questionaire:   1. Have you been to the ER, urgent care clinic since your last visit? Hospitalized since your last visit? NO    2. Have you seen or consulted any other health care providers outside of the [address] [place] [address] since your last visit? Include any pap smears or colon screening. YES    Advanced Directive:   1. Do you have an Advanced Directive? NO    2. Would you like information on Advanced Directives?  NO

## 2021-12-02 DIAGNOSIS — Z76.0 MEDICATION REFILL: ICD-10-CM

## 2021-12-02 RX ORDER — ACYCLOVIR 400 MG/1
TABLET ORAL
Qty: 30 TABLET | Refills: 5 | Status: SHIPPED | OUTPATIENT
Start: 2021-12-02 | End: 2022-06-05

## 2022-01-02 DIAGNOSIS — Z76.0 MEDICATION REFILL: ICD-10-CM

## 2022-01-02 RX ORDER — TAMSULOSIN HYDROCHLORIDE 0.4 MG/1
CAPSULE ORAL
Qty: 90 CAPSULE | Refills: 4 | Status: SHIPPED | OUTPATIENT
Start: 2022-01-02

## 2022-02-22 ENCOUNTER — HOSPITAL ENCOUNTER (OUTPATIENT)
Dept: LAB | Age: 67
Discharge: HOME | End: 2022-02-22
Payer: MEDICARE

## 2022-02-22 DIAGNOSIS — Z51.81 MEDICATION MONITORING ENCOUNTER: ICD-10-CM

## 2022-02-22 DIAGNOSIS — I25.10 ASCVD (ARTERIOSCLEROTIC CARDIOVASCULAR DISEASE): ICD-10-CM

## 2022-02-22 DIAGNOSIS — E03.9 ACQUIRED HYPOTHYROIDISM: ICD-10-CM

## 2022-02-22 LAB
ALBUMIN SERPL-MCNC: 3.8 G/DL (ref 3.4–5)
ALBUMIN/GLOB SERPL: 1.3 {RATIO} (ref 0.8–1.7)
ALP SERPL-CCNC: 72 U/L (ref 45–117)
ALT SERPL-CCNC: 24 U/L (ref 16–61)
ANION GAP SERPL CALC-SCNC: 3 MMOL/L (ref 3–18)
AST SERPL-CCNC: 19 U/L (ref 10–38)
BASOPHILS # BLD: 0.1 K/UL (ref 0–0.1)
BASOPHILS NFR BLD: 2 % (ref 0–2)
BILIRUB SERPL-MCNC: 0.5 MG/DL (ref 0.2–1)
BUN SERPL-MCNC: 14 MG/DL (ref 7–18)
BUN/CREAT SERPL: 15 (ref 12–20)
CALCIUM SERPL-MCNC: 9.1 MG/DL (ref 8.5–10.1)
CHLORIDE SERPL-SCNC: 106 MMOL/L (ref 100–111)
CHOLEST SERPL-MCNC: 185 MG/DL
CO2 SERPL-SCNC: 30 MMOL/L (ref 21–32)
CREAT SERPL-MCNC: 0.96 MG/DL (ref 0.6–1.3)
DIFFERENTIAL METHOD BLD: NORMAL
EOSINOPHIL # BLD: 0.1 K/UL (ref 0–0.4)
EOSINOPHIL NFR BLD: 2 % (ref 0–5)
ERYTHROCYTE [DISTWIDTH] IN BLOOD BY AUTOMATED COUNT: 12.8 % (ref 11.6–14.5)
GLOBULIN SER CALC-MCNC: 2.9 G/DL (ref 2–4)
GLUCOSE SERPL-MCNC: 91 MG/DL (ref 74–99)
HCT VFR BLD AUTO: 46.1 % (ref 36–48)
HDLC SERPL-MCNC: 53 MG/DL (ref 40–60)
HDLC SERPL: 3.5 {RATIO} (ref 0–5)
HGB BLD-MCNC: 15 G/DL (ref 13–16)
IMM GRANULOCYTES # BLD AUTO: 0 K/UL (ref 0–0.04)
IMM GRANULOCYTES NFR BLD AUTO: 0 % (ref 0–0.5)
LDLC SERPL CALC-MCNC: 114.6 MG/DL (ref 0–100)
LIPID PROFILE,FLP: ABNORMAL
LYMPHOCYTES # BLD: 1.3 K/UL (ref 0.9–3.6)
LYMPHOCYTES NFR BLD: 24 % (ref 21–52)
MCH RBC QN AUTO: 30.1 PG (ref 24–34)
MCHC RBC AUTO-ENTMCNC: 32.5 G/DL (ref 31–37)
MCV RBC AUTO: 92.4 FL (ref 78–100)
MONOCYTES # BLD: 0.4 K/UL (ref 0.05–1.2)
MONOCYTES NFR BLD: 7 % (ref 3–10)
NEUTS SEG # BLD: 3.6 K/UL (ref 1.8–8)
NEUTS SEG NFR BLD: 65 % (ref 40–73)
NRBC # BLD: 0 K/UL (ref 0–0.01)
NRBC BLD-RTO: 0 PER 100 WBC
PLATELET # BLD AUTO: 224 K/UL (ref 135–420)
PMV BLD AUTO: 9.8 FL (ref 9.2–11.8)
POTASSIUM SERPL-SCNC: 4.7 MMOL/L (ref 3.5–5.5)
PROT SERPL-MCNC: 6.7 G/DL (ref 6.4–8.2)
RBC # BLD AUTO: 4.99 M/UL (ref 4.35–5.65)
SODIUM SERPL-SCNC: 139 MMOL/L (ref 136–145)
T4 FREE SERPL-MCNC: 1.1 NG/DL (ref 0.7–1.5)
TRIGL SERPL-MCNC: 87 MG/DL (ref ?–150)
TSH SERPL DL<=0.05 MIU/L-ACNC: 0.99 UIU/ML (ref 0.36–3.74)
VLDLC SERPL CALC-MCNC: 17.4 MG/DL
WBC # BLD AUTO: 5.5 K/UL (ref 4.6–13.2)

## 2022-02-22 PROCEDURE — 80061 LIPID PANEL: CPT

## 2022-02-22 PROCEDURE — 36415 COLL VENOUS BLD VENIPUNCTURE: CPT

## 2022-02-22 PROCEDURE — 80053 COMPREHEN METABOLIC PANEL: CPT

## 2022-02-22 PROCEDURE — 84439 ASSAY OF FREE THYROXINE: CPT

## 2022-02-22 PROCEDURE — 85025 COMPLETE CBC W/AUTO DIFF WBC: CPT

## 2022-03-01 ENCOUNTER — OFFICE VISIT (OUTPATIENT)
Dept: INTERNAL MEDICINE CLINIC | Age: 67
End: 2022-03-01
Payer: MEDICARE

## 2022-03-01 VITALS
OXYGEN SATURATION: 98 % | HEART RATE: 71 BPM | RESPIRATION RATE: 16 BRPM | TEMPERATURE: 96.9 F | HEIGHT: 66 IN | SYSTOLIC BLOOD PRESSURE: 117 MMHG | BODY MASS INDEX: 31.12 KG/M2 | DIASTOLIC BLOOD PRESSURE: 65 MMHG | WEIGHT: 193.6 LBS

## 2022-03-01 DIAGNOSIS — E89.0 POSTOPERATIVE HYPOTHYROIDISM: ICD-10-CM

## 2022-03-01 DIAGNOSIS — Z76.0 MEDICATION REFILL: ICD-10-CM

## 2022-03-01 DIAGNOSIS — Z00.00 WELCOME TO MEDICARE PREVENTIVE VISIT: Primary | ICD-10-CM

## 2022-03-01 PROCEDURE — G8536 NO DOC ELDER MAL SCRN: HCPCS | Performed by: INTERNAL MEDICINE

## 2022-03-01 PROCEDURE — G8427 DOCREV CUR MEDS BY ELIG CLIN: HCPCS | Performed by: INTERNAL MEDICINE

## 2022-03-01 PROCEDURE — G0402 INITIAL PREVENTIVE EXAM: HCPCS | Performed by: INTERNAL MEDICINE

## 2022-03-01 PROCEDURE — G8417 CALC BMI ABV UP PARAM F/U: HCPCS | Performed by: INTERNAL MEDICINE

## 2022-03-01 PROCEDURE — G8510 SCR DEP NEG, NO PLAN REQD: HCPCS | Performed by: INTERNAL MEDICINE

## 2022-03-01 RX ORDER — LEVOTHYROXINE SODIUM 88 UG/1
88 TABLET ORAL
Qty: 90 TABLET | Refills: 5 | Status: SHIPPED | OUTPATIENT
Start: 2022-03-01

## 2022-03-01 NOTE — PROGRESS NOTES
This is a \"Welcome to [employer]"  Initial Preventive Physical Examination (IPPE) providing Personalized Prevention Plan Services (Performed in the first 12 months of enrollment)    I have reviewed the patient's medical history in detail and updated the computerized patient record. Just got his Medicare B in January    /65 (BP 1 Location: Left upper arm, BP Patient Position: Sitting, BP Cuff Size: Adult)   Pulse 71   Temp 96.9 °F (36.1 °C) (Temporal)   Resp 16   Ht 5' 6\" (1.676 m)   Wt 193 lb 9.6 oz (87.8 kg)   SpO2 98%   BMI 31.25 kg/m²       Assessment/Plan   Education and counseling provided:  Are appropriate based on today's review and evaluation    1. Welcome to Medicare preventive visit        INCIDENTAL MED REFILL:  2. Postoperative hypothyroidism  -     levothyroxine (SYNTHROID) 88 mcg tablet; Take 1 Tablet by mouth Daily (before breakfast). , Normal, Disp-90 Tablet, R-5  3. Medication refill  -     levothyroxine (SYNTHROID) 88 mcg tablet; Take 1 Tablet by mouth Daily (before breakfast). , Normal, Disp-90 Tablet, R-5       Depression Risk Screen     3 most recent PHQ Screens 3/1/2022   Little interest or pleasure in doing things Not at all   Feeling down, depressed, irritable, or hopeless Not at all   Total Score PHQ 2 0   Trouble falling or staying asleep, or sleeping too much Not at all   Feeling tired or having little energy Not at all   Poor appetite, weight loss, or overeating Not at all   Feeling bad about yourself - or that you are a failure or have let yourself or your family down Not at all   Trouble concentrating on things such as school, work, reading, or watching TV Not at all   Moving or speaking so slowly that other people could have noticed; or the opposite being so fidgety that others notice Not at all   Thoughts of being better off dead, or hurting yourself in some way Not at all   PHQ 9 Score 0   How difficult have these problems made it for you to do your work, take care of your home and get along with others Not difficult at all       Alcohol & Drug Abuse Risk Screen    Do you average more than 1 drink per night or more than 7 drinks a week: No    In the past three months have you have had more than 4 drinks containing alcohol on one occasion: No          Functional Ability and Level of Safety    Diet: No special diet      Hearing: Whisper Test done with normal results. but ? deficit on right      Vision Screening:  Vision is good. Visual Acuity Screening    Right eye Left eye Both eyes   Without correction: 20/50 20/50 20/50   With correction:            Activities of Daily Living: The home contains: no safety equipment. and will be moving into a house that has some grab bars soon. Patient does total self care      Ambulation: with no difficulty      Exercise level: sedentary     Fall Risk Screen:  Fall Risk Assessment, last 12 mths 3/1/2022   Able to walk? Yes   Fall in past 12 months? 0   Do you feel unsteady? 0   Are you worried about falling 0      Abuse Screen:  Patient is not abused       Screening EKG   EKG order placed: No    End of Life Planning   Advanced care planning directives were discussed with the patient and /or family/caregiver.      Health Maintenance Due     Health Maintenance Due   Topic Date Due    Shingrix Vaccine Age 49> (1 of 2) Never done    Flu Vaccine (1) 09/01/2021       Patient Care Team   Patient Care Team:  Osiel Henson MD as PCP - General (Internal Medicine)  Osiel Henson MD as PCP - [facility] Empaneled Provider  Cynthia Kendall MD (Otolaryngology)  Ronak Little DO (Cardiothoracic Surgery)  Onel Mason MD (Otolaryngology)  Jody Estes MD (Hematology and Oncology)  Jared Menjivar [place] (Podiatry)  Deepa Montesinos MD as Consulting Provider (Endocrinology)    History     Past Medical History:   Diagnosis Date    Abscess and cellulitis 8/3/2015    Acquired hypothyroidism 1/11/2016    Adenomatous colon polyp 12/2020    Arrhythmia  Bowen's disease     abdomen    BPH (benign prostatic hyperplasia)     Cancer (HCC)     bowens disease, abdomen    Colon polyps 11/13/2017    ascending colon, 4, adenomatous    Genital herpes     Scoliosis     Sigmoid polyp 3/24/16    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 Dr. Brilele Donis. Anahy Mcdonnell    HX HERNIA REPAIR Left 04/2015    HX OTHER SURGICAL  3/24/16    sigmoidoscopy, Dr. Brielle Donis. Anahy Mcdonnell    HX OTHER SURGICAL  7/15/2014    radical neck, [facility]    HX OTHER SURGICAL  08/05/2016    sigmoidoscopy, Dr. Mckee Lobe  cardiac cath    ? 2005     Current Outpatient Medications   Medication Sig Dispense Refill    levothyroxine (SYNTHROID) 88 mcg tablet Take 1 Tablet by mouth Daily (before breakfast). 90 Tablet 5    tamsulosin (FLOMAX) 0.4 mg capsule TAKE 1 CAPSULE BY MOUTH DAILY 90 Capsule 4    acyclovir (ZOVIRAX) 400 mg tablet TAKE 1 TABLET BY MOUTH DAILY 30 Tablet 5    desonide (DESOWEN) 0.05 % topical ointment APPLY EXTERNALLY TO THE AFFECTED AREA TWICE DAILY 30 g 5    flecainide (TAMBOCOR) 50 mg tablet Take 50 mg by mouth two (2) times a day.  aspirin delayed-release 81 mg tablet Take 81 mg by mouth daily.        No Known Allergies    Family History   Problem Relation Age of Onset    Cancer Mother     Heart Disease Father     Cancer Paternal Uncle      Social History     Tobacco Use    Smoking status: Former Smoker     Types: Pipe    Smokeless tobacco: Never Used   Substance Use Topics    Alcohol use: No     Comment: 12/21/1991 stopped       Samira Iverson MD

## 2022-03-01 NOTE — PROGRESS NOTES
Reviewed record in preparation for visit and have obtained necessary documentation. Roger Arriaga III is a 74 y.o.  male presents today for office visit for   Chief Complaint   Patient presents with    Medication Refill   . Pt is not fasting. Patient is accompanied by self. I have received verbal consent from Roger Arriaga III to discuss any/all medical information while they are present in the room. Pt is in Room # 7400 [facility]  Floor III preferred language for health care discussion is english/other. Is the patient using any DME equipment during OV? NO    Advance Directive:  1. Do you have an advance directive in place? Patient Reply: NO    2. If not, would you like material regarding how to put one in place? NO      1. \"Have you been to the ER, urgent care clinic since your last visit? Hospitalized since your last visit? \" No    2. \"Have you seen or consulted any other health care providers outside of the [address] [place] [address] since your last visit? \" No     3. For patients aged 39-70: Has the patient had a colonoscopy? Yes - no Care Gap present     If the patient is female:    4. For patients aged 41-77: Has the patient had a mammogram within the past 2 years? NA - based on age    11. For patients aged 21-65: Has the patient had a pap smear? NA - based on age    Upcoming Appts  No    VORB: No orders of the defined types were placed in this encounter.  Roberto Gross MD/Shannon Freed LPN    Roger Arriaga III is due for:   Health Maintenance Due   Topic    Shingrix Vaccine Age 50> (1 of 2)    Flu Vaccine (1)    Medicare Yearly Exam      Health Maintenance reviewed and discussed per provider  Please order/place referral if appropriate. Requested Prescriptions     Pending Prescriptions Disp Refills    levothyroxine (SYNTHROID) 88 mcg tablet 90 Tablet 5     Sig: Take 1 Tablet by mouth Daily (before breakfast).        Learning Assessment:  Learning Assessment 1/29/2015 3/14/2014   PRIMARY LEARNER Patient Patient   HIGHEST LEVEL OF EDUCATION - PRIMARY LEARNER  SOME COLLEGE 2 YEARS OF COLLEGE   BARRIERS PRIMARY LEARNER NONE NONE   CO-LEARNER CAREGIVER - No   PRIMARY LANGUAGE ENGLISH ENGLISH    NEED - No   LEARNER PREFERENCE PRIMARY DEMONSTRATION DEMONSTRATION   LEARNING SPECIAL TOPICS - none   ANSWERED BY patient self   RELATIONSHIP SELF SELF     Depression Screening:  3 most recent [facility] Screens 3/1/2022 3/1/2022 6/22/2021 3/13/2019 8/23/2018 7/18/2017 2/11/2017   Little interest or pleasure in doing things Not at all Not at all Not at all Not at all Not at all Not at all Not at all   Feeling down, depressed, irritable, or hopeless Not at all Not at all Not at all Not at all Not at all Not at all Not at all   Total Score PHQ 2 0 0 0 0 0 0 0   Trouble falling or staying asleep, or sleeping too much Not at all - - - - - -   Feeling tired or having little energy Not at all - - - - - -   Poor appetite, weight loss, or overeating Not at all - - - - - -   Feeling bad about yourself - or that you are a failure or have let yourself or your family down Not at all - - - - - -   Trouble concentrating on things such as school, work, reading, or watching TV Not at all - - - - - -   Moving or speaking so slowly that other people could have noticed; or the opposite being so fidgety that others notice Not at all - - - - - -   Thoughts of being better off dead, or hurting yourself in some way Not at all - - - - - -   PHQ 9 Score 0 - - - - - -   How difficult have these problems made it for you to do your work, take care of your home and get along with others Not difficult at all - - - - - -     Abuse Screening:  Abuse Screening Questionnaire 4/16/2015   Do you ever feel afraid of your partner? N   Are you in a relationship with someone who physically or mentally threatens you? N   Is it safe for you to go home?  Y     Fall Risk  Fall Risk Assessment, last 12 mths 3/1/2022 6/22/2021 3/13/2019 8/23/2018   Able to walk? Yes Yes Yes Yes   Fall in past 12 months? 0 0 No No   Do you feel unsteady? 0 - - -   Are you worried about falling 0 - - -     Recent Travel Screening and Travel History documentation     Travel Screening     Question   Response    In the last month, have you been in contact with someone who was confirmed or suspected to have Coronavirus / COVID-19? No / Unsure    Have you had a COVID-19 viral test in the last 14 days? No    Do you have any of the following new or worsening symptoms? None of these    Have you traveled internationally or domestically in the last month?   No      Travel History   Travel since 02/01/22    No documented travel since 02/01/22

## 2022-03-01 NOTE — PATIENT INSTRUCTIONS
Medicare Wellness Visit, Male    The best way to live healthy is to have a lifestyle where you eat a well-balanced diet, exercise regularly, limit alcohol use, and quit all forms of tobacco/nicotine, if applicable. Regular preventive services are another way to keep healthy. Preventive services (vaccines, screening tests, monitoring & exams) can help personalize your care plan, which helps you manage your own care. Screening tests can find health problems at the earliest stages, when they are easiest to treat. [facility] follows the current, evidence-based guidelines published by the [facility] ([facility]) when recommending preventive services for our patients. Because we follow these guidelines, sometimes recommendations change over time as research supports it. (For example, a prostate screening blood test is no longer routinely recommended for men with no symptoms). Of course, you and your doctor may decide to screen more often for some diseases, based on your risk and co-morbidities (chronic disease you are already diagnosed with). Preventive services for you include:  - Medicare offers their members a free annual wellness visit, which is time for you and your primary care provider to discuss and plan for your preventive service needs. Take advantage of this benefit every year!  -All adults over age 72 should receive the recommended pneumonia vaccines. Current USPSTF guidelines recommend a series of two vaccines for the best pneumonia protection.   -All adults should have a flu vaccine yearly and tetanus vaccine every 10 years.  -All adults age 48 and older should receive the shingles vaccines (series of two vaccines).        -All adults age 38-68 who are overweight should have a diabetes screening test once every three years.   -Other screening tests & preventive services for persons with diabetes include: an eye exam to screen for diabetic retinopathy, a kidney function test, a foot exam, and stricter control over your cholesterol.   -Cardiovascular screening for adults with routine risk involves an electrocardiogram (ECG) at intervals determined by the provider.   -Colorectal cancer screening should be done for adults age 54-65 with no increased risk factors for colorectal cancer. There are a number of acceptable methods of screening for this type of cancer. Each test has its own benefits and drawbacks. Discuss with your provider what is most appropriate for you during your annual wellness visit. The different tests include: colonoscopy (considered the best screening method), a fecal occult blood test, a fecal DNA test, and sigmoidoscopy.  -All adults born between [facility] should be screened once for Hepatitis C.  -An Abdominal Aortic Aneurysm (AAA) Screening is recommended for men age 73-68 who has ever smoked in their lifetime.      Here is a list of your current Health Maintenance items (your personalized list of preventive services) with a due date:  Health Maintenance Due   Topic Date Due    Shingles Vaccine (1 of 2) Never done    Yearly Flu Vaccine (1) 09/01/2021

## 2022-03-14 DIAGNOSIS — Z76.0 MEDICATION REFILL: ICD-10-CM

## 2022-03-15 RX ORDER — DESONIDE 0.5 MG/G
OINTMENT TOPICAL
Qty: 30 G | Refills: 5 | Status: SHIPPED | OUTPATIENT
Start: 2022-03-15

## 2022-03-19 PROBLEM — H00.012 HORDEOLUM EXTERNUM OF RIGHT LOWER EYELID: Status: ACTIVE | Noted: 2017-03-03

## 2022-06-05 DIAGNOSIS — Z76.0 MEDICATION REFILL: ICD-10-CM

## 2022-06-05 RX ORDER — ACYCLOVIR 400 MG/1
TABLET ORAL
Qty: 30 TABLET | Refills: 5 | Status: SHIPPED | OUTPATIENT
Start: 2022-06-05

## 2022-11-29 DIAGNOSIS — Z76.0 MEDICATION REFILL: ICD-10-CM

## 2022-11-29 RX ORDER — ACYCLOVIR 400 MG/1
TABLET ORAL
Qty: 30 TABLET | Refills: 5 | Status: SHIPPED | OUTPATIENT
Start: 2022-11-29

## 2023-02-06 NOTE — PROGRESS NOTES
Pt started feeling ill yesterday. Has a low grade temp. Some cough but nothing coming up. Not short of breath at this time  Cannot take Paxlovid because of other meds  Did not have the most recent COVID booster   Previous monoclonal antibodies do not work for the omicron variants. Not sure of availability of bebtelovimab locally. Recommend watching and monitoring for high fever, dyspnea, and falling O2 sats. Quarantine at home for 10 days  If worsens, go to the hospital          As of August 12, 2022    Recommending that if you test positive for COVID-19, you stay home for at least 5 days and isolate from others in your home. You are likely most infectious during these first 5 days. Wear a high-quality mask when you must be around others at home and in public. If after 5 days you are fever-free for 24 hours without the use of medication, and your symptoms are improving, or you never had symptoms, you may end isolation after day 5. Regardless of when you end isolation, avoid being around people who are more likely to get very sick from COVID-19 until at least day 11. You should wear a high-quality mask through day 10.

## 2023-02-17 ENCOUNTER — TELEPHONE (OUTPATIENT)
Facility: CLINIC | Age: 68
End: 2023-02-17

## 2023-02-17 RX ORDER — ACYCLOVIR 400 MG/1
TABLET ORAL
COMMUNITY
Start: 2023-02-09

## 2023-02-17 RX ORDER — FLECAINIDE ACETATE 50 MG/1
50 TABLET ORAL 2 TIMES DAILY
COMMUNITY
Start: 2017-02-28

## 2023-02-17 RX ORDER — TAMSULOSIN HYDROCHLORIDE 0.4 MG/1
CAPSULE ORAL
COMMUNITY
Start: 2022-12-30

## 2023-02-17 RX ORDER — LEVOTHYROXINE SODIUM 88 UG/1
88 TABLET ORAL
COMMUNITY
Start: 2022-03-01

## 2023-02-17 NOTE — TELEPHONE ENCOUNTER
Spoke with pt in regards to COVID-19 concerns. Two patient identifier's verified. Relayed the PCP's note, This is all normal for some persons who have picked up [place]. If he is having trouble breathing or spikes a high fever (over 101 F) he should probably be seen at an urgent care or ER. Pt acknowledges understanding and voices no concerns at this time. 196/104 on Tuesday started to go down to 154/94. Pt states rechecking and BP returned normal. Pt states the next day 125/69 am, 140/79, pm 164/90. Pt states he is taking Flecainide for a. Fib. Pt is concerned if the fluctuation is related to flecainide. Pt states he took Amiodarone and metoprolol while inpatient prior to flecainide. Pt was like to know if his BP ever returns high, could he take a metoprolol to assist to being BP down. PCP notified and advised pt needs to be seen. Transferred to scheduling.

## 2023-02-17 NOTE — TELEPHONE ENCOUNTER
Pt called in stating that he tested positive for Covid 2/5. Since then has been experiencing Headaches on and off, Mucus, mild temperature here and there, Blood Pressure irregularities(did not know any readings off the top of his head), Depression and Anxiety. Pt requesting a call back.

## 2023-02-20 ENCOUNTER — OFFICE VISIT (OUTPATIENT)
Facility: CLINIC | Age: 68
End: 2023-02-20
Payer: MEDICARE

## 2023-02-20 VITALS
TEMPERATURE: 97.5 F | HEIGHT: 66 IN | HEART RATE: 85 BPM | DIASTOLIC BLOOD PRESSURE: 88 MMHG | WEIGHT: 192.13 LBS | BODY MASS INDEX: 30.88 KG/M2 | OXYGEN SATURATION: 97 % | SYSTOLIC BLOOD PRESSURE: 155 MMHG | RESPIRATION RATE: 15 BRPM

## 2023-02-20 DIAGNOSIS — U07.1 COVID-19: ICD-10-CM

## 2023-02-20 DIAGNOSIS — G89.29 CHRONIC LEFT-SIDED LOW BACK PAIN WITHOUT SCIATICA: ICD-10-CM

## 2023-02-20 DIAGNOSIS — I99.8 FLUCTUATING BLOOD PRESSURE: Primary | ICD-10-CM

## 2023-02-20 DIAGNOSIS — F32.89 OTHER DEPRESSION: ICD-10-CM

## 2023-02-20 DIAGNOSIS — I48.91 ATRIAL FIBRILLATION, UNSPECIFIED TYPE (HCC): ICD-10-CM

## 2023-02-20 DIAGNOSIS — M54.50 CHRONIC LEFT-SIDED LOW BACK PAIN WITHOUT SCIATICA: ICD-10-CM

## 2023-02-20 DIAGNOSIS — R45.89 DYSPHORIC MOOD: ICD-10-CM

## 2023-02-20 PROCEDURE — G8484 FLU IMMUNIZE NO ADMIN: HCPCS | Performed by: INTERNAL MEDICINE

## 2023-02-20 PROCEDURE — 99214 OFFICE O/P EST MOD 30 MIN: CPT | Performed by: INTERNAL MEDICINE

## 2023-02-20 PROCEDURE — G8427 DOCREV CUR MEDS BY ELIG CLIN: HCPCS | Performed by: INTERNAL MEDICINE

## 2023-02-20 PROCEDURE — 1036F TOBACCO NON-USER: CPT | Performed by: INTERNAL MEDICINE

## 2023-02-20 PROCEDURE — 1123F ACP DISCUSS/DSCN MKR DOCD: CPT | Performed by: INTERNAL MEDICINE

## 2023-02-20 PROCEDURE — G8417 CALC BMI ABV UP PARAM F/U: HCPCS | Performed by: INTERNAL MEDICINE

## 2023-02-20 PROCEDURE — 3017F COLORECTAL CA SCREEN DOC REV: CPT | Performed by: INTERNAL MEDICINE

## 2023-02-20 RX ORDER — ASPIRIN 81 MG/1
81 TABLET ORAL DAILY
COMMUNITY

## 2023-02-20 RX ORDER — TIZANIDINE 4 MG/1
4 TABLET ORAL 3 TIMES DAILY PRN
Qty: 30 TABLET | Refills: 0 | Status: SHIPPED | OUTPATIENT
Start: 2023-02-20

## 2023-02-20 RX ORDER — FLUTICASONE PROPIONATE 50 MCG
1 SPRAY, SUSPENSION (ML) NASAL DAILY
COMMUNITY

## 2023-02-20 SDOH — ECONOMIC STABILITY: INCOME INSECURITY: HOW HARD IS IT FOR YOU TO PAY FOR THE VERY BASICS LIKE FOOD, HOUSING, MEDICAL CARE, AND HEATING?: NOT HARD AT ALL

## 2023-02-20 SDOH — ECONOMIC STABILITY: FOOD INSECURITY: WITHIN THE PAST 12 MONTHS, YOU WORRIED THAT YOUR FOOD WOULD RUN OUT BEFORE YOU GOT MONEY TO BUY MORE.: NEVER TRUE

## 2023-02-20 SDOH — ECONOMIC STABILITY: FOOD INSECURITY: WITHIN THE PAST 12 MONTHS, THE FOOD YOU BOUGHT JUST DIDN'T LAST AND YOU DIDN'T HAVE MONEY TO GET MORE.: NEVER TRUE

## 2023-02-20 SDOH — ECONOMIC STABILITY: HOUSING INSECURITY
IN THE LAST 12 MONTHS, WAS THERE A TIME WHEN YOU DID NOT HAVE A STEADY PLACE TO SLEEP OR SLEPT IN A SHELTER (INCLUDING NOW)?: NO

## 2023-02-20 ASSESSMENT — PATIENT HEALTH QUESTIONNAIRE - PHQ9
SUM OF ALL RESPONSES TO PHQ9 QUESTIONS 1 & 2: 3
3. TROUBLE FALLING OR STAYING ASLEEP: 3
1. LITTLE INTEREST OR PLEASURE IN DOING THINGS: 0
6. FEELING BAD ABOUT YOURSELF - OR THAT YOU ARE A FAILURE OR HAVE LET YOURSELF OR YOUR FAMILY DOWN: 2
9. THOUGHTS THAT YOU WOULD BE BETTER OFF DEAD, OR OF HURTING YOURSELF: 0
2. FEELING DOWN, DEPRESSED OR HOPELESS: 3
8. MOVING OR SPEAKING SO SLOWLY THAT OTHER PEOPLE COULD HAVE NOTICED. OR THE OPPOSITE, BEING SO FIGETY OR RESTLESS THAT YOU HAVE BEEN MOVING AROUND A LOT MORE THAN USUAL: 0
SUM OF ALL RESPONSES TO PHQ QUESTIONS 1-9: 14
5. POOR APPETITE OR OVEREATING: 1
SUM OF ALL RESPONSES TO PHQ QUESTIONS 1-9: 14
10. IF YOU CHECKED OFF ANY PROBLEMS, HOW DIFFICULT HAVE THESE PROBLEMS MADE IT FOR YOU TO DO YOUR WORK, TAKE CARE OF THINGS AT HOME, OR GET ALONG WITH OTHER PEOPLE: 0
7. TROUBLE CONCENTRATING ON THINGS, SUCH AS READING THE NEWSPAPER OR WATCHING TELEVISION: 2
4. FEELING TIRED OR HAVING LITTLE ENERGY: 3

## 2023-02-20 ASSESSMENT — ENCOUNTER SYMPTOMS: BACK PAIN: 1

## 2023-02-20 NOTE — PROGRESS NOTES
HISTORY OF PRESENT ILLNESS      Brandon Madrigal III is a 69 y.o. male. BP (!) 155/88 (Site: Right Upper Arm, Position: Sitting, Cuff Size: Medium Adult) Comment: w/ meds  Pulse 85   Temp 97.5 °F (36.4 °C) (Temporal)   Resp 15   Ht 5' 6\" (1.676 m)   Wt 192 lb 2 oz (87.1 kg)   SpO2 97%   BMI 31.01 kg/m²     Here to discuss his BP. It has been up and down a lot, more so last last week. The last 3 days have been almost normal for him    He has been taking his BP a bit more often (several times a day). He had one day of a reading that obviously was wrong (61/51) and had a racing HR in the 120's. This seemed to last several hours  He does have atrial fibrillation which has been controlled on flecainide    This all started after he tested + for COVID on 2/5/23  He still has fatigue. He does not have a residual cough. Hypertension  This is a chronic problem. The problem has been waxing and waning since onset. Back Pain  This is a recurrent (has recurrent left lower back/flank pain. no paoin into his buttock or left leg) problem. The current episode started more than 1 year ago. The problem occurs intermittently. Review of Systems   Constitutional:  Positive for fatigue. Genitourinary:         He relates some xs voiding   Musculoskeletal:  Positive for back pain. Psychiatric/Behavioral:  Positive for dysphoric mood. Negative for suicidal ideas. The patient is nervous/anxious. Physical Exam  Vitals and nursing note reviewed. Constitutional:       Appearance: Normal appearance. HENT:      Head: Normocephalic and atraumatic. Cardiovascular:      Rate and Rhythm: Normal rate. Rhythm irregular. Pulses: Normal pulses. Pulmonary:      Effort: Pulmonary effort is normal.      Breath sounds: Normal breath sounds. Skin:     General: Skin is warm and dry. Neurological:      General: No focal deficit present. Mental Status: He is alert and oriented to person, place, and time. Psychiatric:         Mood and Affect: Mood normal.         Behavior: Behavior normal.       ASSESSMENT and PLAN      ICD-10-CM    1. Fluctuating blood pressure  I99.8       2. COVID-19  U07.1       3. Chronic left-sided low back pain without sciatica  M54.50     G89.29       4. Atrial fibrillation, unspecified type ([facility]  I48.91       5. Other depression  F32.89       6. Dysphoric mood  R45.89            BP fluctuations are likely related to recent COVID. His home readings have settled down. Continue same meds  He asked if he could take a metoprolol if his heart races again. Advised probably OK as his HR is fine. He may have been having bursts of his a-fib.     Advised him that the fatigue and depressed feelings he had are also likely to being ill and should improve quickly    F/u 2 months for MWV, BP recheck, thyroid

## 2023-02-20 NOTE — PROGRESS NOTES
1. \"Have you been to the ER, urgent care clinic since your last visit? Hospitalized since your last visit? \" No    2. \"Have you seen or consulted any other health care providers outside of the [address] [place] [address] since your last visit? \" No     3. For patients aged 39-70: Has the patient had a colonoscopy / FIT/ Cologuard? Yes - no Care Gap present      If the patient is female:    4. For patients aged 41-77: Has the patient had a mammogram within the past 2 years? NA - based on age or sex      11. For patients aged 21-65: Has the patient had a pap smear? NA - based on age or sex    This patient is accompanied in the office by his spouse.

## 2023-02-27 RX ORDER — TIZANIDINE 4 MG/1
TABLET ORAL
Qty: 30 TABLET | Refills: 0 | Status: SHIPPED | OUTPATIENT
Start: 2023-02-27

## 2023-03-31 RX ORDER — TAMSULOSIN HYDROCHLORIDE 0.4 MG/1
CAPSULE ORAL
Qty: 90 CAPSULE | Refills: 3 | Status: SHIPPED | OUTPATIENT
Start: 2023-03-31

## 2023-04-11 PROBLEM — H00.012 HORDEOLUM EXTERNUM OF RIGHT LOWER EYELID: Status: RESOLVED | Noted: 2017-03-03 | Resolved: 2023-04-11

## 2023-05-15 RX ORDER — LEVOTHYROXINE SODIUM 88 UG/1
TABLET ORAL
Qty: 90 TABLET | Refills: 3 | Status: SHIPPED | OUTPATIENT
Start: 2023-05-15

## 2023-06-02 RX ORDER — ACYCLOVIR 400 MG/1
TABLET ORAL
Qty: 90 TABLET | Refills: 3 | Status: SHIPPED | OUTPATIENT
Start: 2023-06-02

## 2023-10-11 ENCOUNTER — OFFICE VISIT (OUTPATIENT)
Facility: CLINIC | Age: 68
End: 2023-10-11
Payer: MEDICARE

## 2023-10-11 ENCOUNTER — HOSPITAL ENCOUNTER (OUTPATIENT)
Facility: HOSPITAL | Age: 68
Setting detail: SPECIMEN
Discharge: HOME | End: 2023-10-14
Payer: MEDICARE

## 2023-10-11 VITALS
DIASTOLIC BLOOD PRESSURE: 63 MMHG | HEIGHT: 66 IN | HEART RATE: 70 BPM | WEIGHT: 200.8 LBS | SYSTOLIC BLOOD PRESSURE: 123 MMHG | BODY MASS INDEX: 32.27 KG/M2 | OXYGEN SATURATION: 95 % | RESPIRATION RATE: 20 BRPM | TEMPERATURE: 97.6 F

## 2023-10-11 DIAGNOSIS — M54.32 LEFT SIDED SCIATICA: ICD-10-CM

## 2023-10-11 DIAGNOSIS — E03.9 ACQUIRED HYPOTHYROIDISM: ICD-10-CM

## 2023-10-11 DIAGNOSIS — E03.9 ACQUIRED HYPOTHYROIDISM: Primary | ICD-10-CM

## 2023-10-11 DIAGNOSIS — Z23 ENCOUNTER FOR IMMUNIZATION: ICD-10-CM

## 2023-10-11 LAB
T4 FREE SERPL-MCNC: 1.2 NG/DL (ref 0.7–1.5)
TSH SERPL DL<=0.05 MIU/L-ACNC: 1.14 UIU/ML (ref 0.36–3.74)

## 2023-10-11 PROCEDURE — 1123F ACP DISCUSS/DSCN MKR DOCD: CPT | Performed by: INTERNAL MEDICINE

## 2023-10-11 PROCEDURE — 90694 VACC AIIV4 NO PRSRV 0.5ML IM: CPT | Performed by: INTERNAL MEDICINE

## 2023-10-11 PROCEDURE — 84443 ASSAY THYROID STIM HORMONE: CPT

## 2023-10-11 PROCEDURE — 1036F TOBACCO NON-USER: CPT | Performed by: INTERNAL MEDICINE

## 2023-10-11 PROCEDURE — 90677 PCV20 VACCINE IM: CPT | Performed by: INTERNAL MEDICINE

## 2023-10-11 PROCEDURE — 84439 ASSAY OF FREE THYROXINE: CPT

## 2023-10-11 PROCEDURE — G8417 CALC BMI ABV UP PARAM F/U: HCPCS | Performed by: INTERNAL MEDICINE

## 2023-10-11 PROCEDURE — G0009 ADMIN PNEUMOCOCCAL VACCINE: HCPCS | Performed by: INTERNAL MEDICINE

## 2023-10-11 PROCEDURE — G0008 ADMIN INFLUENZA VIRUS VAC: HCPCS | Performed by: INTERNAL MEDICINE

## 2023-10-11 PROCEDURE — 36415 COLL VENOUS BLD VENIPUNCTURE: CPT

## 2023-10-11 PROCEDURE — 3017F COLORECTAL CA SCREEN DOC REV: CPT | Performed by: INTERNAL MEDICINE

## 2023-10-11 PROCEDURE — G8484 FLU IMMUNIZE NO ADMIN: HCPCS | Performed by: INTERNAL MEDICINE

## 2023-10-11 PROCEDURE — G8427 DOCREV CUR MEDS BY ELIG CLIN: HCPCS | Performed by: INTERNAL MEDICINE

## 2023-10-11 PROCEDURE — 99213 OFFICE O/P EST LOW 20 MIN: CPT | Performed by: INTERNAL MEDICINE

## 2023-10-11 ASSESSMENT — ENCOUNTER SYMPTOMS
BACK PAIN: 1
SHORTNESS OF BREATH: 0

## 2023-10-11 NOTE — PROGRESS NOTES
ROOM # 4    Identified pt with two pt identifiers(name and ). Reviewed record in preparation for visit and have obtained necessary documentation. Chief Complaint   Patient presents with    Annual Exam     Patient requested through [employer] a prostate exam at ov. F/u thyroid    Atrial Fibrillation     Sees cardiology      Penny Jessica III preferred language for health care discussion is English/other. Is the patient using any DME equipment during OV? NO    Penny Jessica III is due for:  Health Maintenance Due   Topic    Shingles vaccine (1 of 2)    AAA screen     Pneumococcal 65+ years Vaccine (2 - PCV)    COVID-19 Vaccine (4 - Pfizer series)    Flu vaccine (1)     Health Maintenance reviewed and discussed per provider  Please order/place referral if appropriate. 1. For patients aged 43-73: Has the patient had a colonoscopy? NO CARE GAP PRESENT    Advance Directive:  1. Do you have an advance directive in place? Patient Reply: NOT ASKED    2. If not, would you like material regarding how to put one in place? NOT ASKED    Coordination of Care:  1. Have you been to the ER, urgent care clinic since your last visit? Hospitalized since your last visit? NO    2. Have you seen or consulted any other health care providers outside of the [address] [place] [address] since your last visit? Include any pap smears or colon screening. NO    Patient is accompanied by HIMSELF I have received verbal consent from Penny Jessica III to discuss any/all medical information while they are present in the room. Learning Assessment:  No flowsheet data found. Depression Screening:       No data to display              Abuse Screening:       No data to display              Fall Risk  No flowsheet data found.   Recent Travel Screening and Travel History documentation     Travel Screening     No screening recorded since 10/10/23 0000       Travel History   Travel since 23    No documented travel since 23

## 2023-10-11 NOTE — PROGRESS NOTES
After obtaining consent, and per orders of Dr. Sidra Mendoza, injection of flu ( left arm ) and PCV 20 given in Right arm by Dexter Thayer MA. Patient instructed to remain in clinic for 20 minutes afterwards, and to report any adverse reaction to me immediately.

## 2024-03-12 RX ORDER — TAMSULOSIN HYDROCHLORIDE 0.4 MG/1
CAPSULE ORAL
Qty: 90 CAPSULE | Refills: 3 | Status: SHIPPED | OUTPATIENT
Start: 2024-03-12

## 2024-04-08 SDOH — HEALTH STABILITY: PHYSICAL HEALTH: ON AVERAGE, HOW MANY MINUTES DO YOU ENGAGE IN EXERCISE AT THIS LEVEL?: 30 MIN

## 2024-04-08 SDOH — HEALTH STABILITY: PHYSICAL HEALTH: ON AVERAGE, HOW MANY DAYS PER WEEK DO YOU ENGAGE IN MODERATE TO STRENUOUS EXERCISE (LIKE A BRISK WALK)?: 7 DAYS

## 2024-04-08 ASSESSMENT — PATIENT HEALTH QUESTIONNAIRE - PHQ9
9. THOUGHTS THAT YOU WOULD BE BETTER OFF DEAD, OR OF HURTING YOURSELF: NOT AT ALL
SUM OF ALL RESPONSES TO PHQ QUESTIONS 1-9: 0
10. IF YOU CHECKED OFF ANY PROBLEMS, HOW DIFFICULT HAVE THESE PROBLEMS MADE IT FOR YOU TO DO YOUR WORK, TAKE CARE OF THINGS AT HOME, OR GET ALONG WITH OTHER PEOPLE: NOT DIFFICULT AT ALL
SUM OF ALL RESPONSES TO PHQ9 QUESTIONS 1 & 2: 0
7. TROUBLE CONCENTRATING ON THINGS, SUCH AS READING THE NEWSPAPER OR WATCHING TELEVISION: NOT AT ALL
8. MOVING OR SPEAKING SO SLOWLY THAT OTHER PEOPLE COULD HAVE NOTICED. OR THE OPPOSITE, BEING SO FIGETY OR RESTLESS THAT YOU HAVE BEEN MOVING AROUND A LOT MORE THAN USUAL: NOT AT ALL
5. POOR APPETITE OR OVEREATING: NOT AT ALL
SUM OF ALL RESPONSES TO PHQ QUESTIONS 1-9: 0
1. LITTLE INTEREST OR PLEASURE IN DOING THINGS: NOT AT ALL
6. FEELING BAD ABOUT YOURSELF - OR THAT YOU ARE A FAILURE OR HAVE LET YOURSELF OR YOUR FAMILY DOWN: NOT AT ALL
SUM OF ALL RESPONSES TO PHQ QUESTIONS 1-9: 0
2. FEELING DOWN, DEPRESSED OR HOPELESS: NOT AT ALL
4. FEELING TIRED OR HAVING LITTLE ENERGY: NOT AT ALL
3. TROUBLE FALLING OR STAYING ASLEEP: NOT AT ALL
SUM OF ALL RESPONSES TO PHQ QUESTIONS 1-9: 0

## 2024-04-08 ASSESSMENT — LIFESTYLE VARIABLES
HOW OFTEN DO YOU HAVE SIX OR MORE DRINKS ON ONE OCCASION: 1
HOW MANY STANDARD DRINKS CONTAINING ALCOHOL DO YOU HAVE ON A TYPICAL DAY: 0
HOW OFTEN DO YOU HAVE A DRINK CONTAINING ALCOHOL: NEVER
HOW OFTEN DO YOU HAVE A DRINK CONTAINING ALCOHOL: 1
HOW MANY STANDARD DRINKS CONTAINING ALCOHOL DO YOU HAVE ON A TYPICAL DAY: PATIENT DOES NOT DRINK

## 2024-04-11 ENCOUNTER — OFFICE VISIT (OUTPATIENT)
Facility: CLINIC | Age: 69
End: 2024-04-11

## 2024-04-11 ENCOUNTER — HOSPITAL ENCOUNTER (OUTPATIENT)
Facility: HOSPITAL | Age: 69
Setting detail: SPECIMEN
Discharge: HOME | End: 2024-04-11
Payer: MEDICARE

## 2024-04-11 VITALS
HEART RATE: 65 BPM | DIASTOLIC BLOOD PRESSURE: 77 MMHG | HEIGHT: 66 IN | TEMPERATURE: 97 F | BODY MASS INDEX: 31.66 KG/M2 | OXYGEN SATURATION: 96 % | SYSTOLIC BLOOD PRESSURE: 119 MMHG | RESPIRATION RATE: 15 BRPM | WEIGHT: 197 LBS

## 2024-04-11 DIAGNOSIS — I48.91 ATRIAL FIBRILLATION, UNSPECIFIED TYPE (HCC): ICD-10-CM

## 2024-04-11 DIAGNOSIS — E03.9 ACQUIRED HYPOTHYROIDISM: ICD-10-CM

## 2024-04-11 DIAGNOSIS — I25.10 ASCVD (ARTERIOSCLEROTIC CARDIOVASCULAR DISEASE): ICD-10-CM

## 2024-04-11 DIAGNOSIS — Z00.00 MEDICARE ANNUAL WELLNESS VISIT, SUBSEQUENT: Primary | ICD-10-CM

## 2024-04-11 LAB
ALBUMIN SERPL-MCNC: 3.7 G/DL (ref 3.4–5)
ALBUMIN/GLOB SERPL: 1.2 (ref 0.8–1.7)
ALP SERPL-CCNC: 69 U/L (ref 45–117)
ALT SERPL-CCNC: 27 U/L (ref 16–61)
ANION GAP SERPL CALC-SCNC: 3 MMOL/L (ref 3–18)
AST SERPL-CCNC: 25 U/L (ref 10–38)
BILIRUB SERPL-MCNC: 0.8 MG/DL (ref 0.2–1)
BUN SERPL-MCNC: 15 MG/DL (ref 7–18)
BUN/CREAT SERPL: 16 (ref 12–20)
CALCIUM SERPL-MCNC: 9.2 MG/DL (ref 8.5–10.1)
CHLORIDE SERPL-SCNC: 105 MMOL/L (ref 100–111)
CHOLEST SERPL-MCNC: 185 MG/DL
CO2 SERPL-SCNC: 29 MMOL/L (ref 21–32)
CREAT SERPL-MCNC: 0.91 MG/DL (ref 0.6–1.3)
GLOBULIN SER CALC-MCNC: 3 G/DL (ref 2–4)
GLUCOSE SERPL-MCNC: 89 MG/DL (ref 74–99)
HDLC SERPL-MCNC: 52 MG/DL (ref 40–60)
HDLC SERPL: 3.6 (ref 0–5)
LDLC SERPL CALC-MCNC: 103.4 MG/DL (ref 0–100)
LIPID PANEL: ABNORMAL
POTASSIUM SERPL-SCNC: 4.2 MMOL/L (ref 3.5–5.5)
PROT SERPL-MCNC: 6.7 G/DL (ref 6.4–8.2)
SODIUM SERPL-SCNC: 137 MMOL/L (ref 136–145)
T4 FREE SERPL-MCNC: 1.1 NG/DL (ref 0.7–1.5)
TRIGL SERPL-MCNC: 148 MG/DL
TSH SERPL DL<=0.05 MIU/L-ACNC: 1.03 UIU/ML (ref 0.36–3.74)
VLDLC SERPL CALC-MCNC: 29.6 MG/DL

## 2024-04-11 PROCEDURE — 80053 COMPREHEN METABOLIC PANEL: CPT

## 2024-04-11 PROCEDURE — 84443 ASSAY THYROID STIM HORMONE: CPT

## 2024-04-11 PROCEDURE — 36415 COLL VENOUS BLD VENIPUNCTURE: CPT

## 2024-04-11 PROCEDURE — 84439 ASSAY OF FREE THYROXINE: CPT

## 2024-04-11 PROCEDURE — 80061 LIPID PANEL: CPT

## 2024-04-11 RX ORDER — ACYCLOVIR 400 MG/1
400 TABLET ORAL DAILY
Qty: 90 TABLET | Refills: 3 | Status: SHIPPED | OUTPATIENT
Start: 2024-04-11

## 2024-04-11 RX ORDER — LEVOTHYROXINE SODIUM 88 UG/1
88 TABLET ORAL
Qty: 90 TABLET | Refills: 3 | Status: SHIPPED | OUTPATIENT
Start: 2024-04-11

## 2024-04-11 SDOH — ECONOMIC STABILITY: FOOD INSECURITY: WITHIN THE PAST 12 MONTHS, THE FOOD YOU BOUGHT JUST DIDN'T LAST AND YOU DIDN'T HAVE MONEY TO GET MORE.: NEVER TRUE

## 2024-04-11 SDOH — ECONOMIC STABILITY: FOOD INSECURITY: WITHIN THE PAST 12 MONTHS, YOU WORRIED THAT YOUR FOOD WOULD RUN OUT BEFORE YOU GOT MONEY TO BUY MORE.: NEVER TRUE

## 2024-04-11 SDOH — ECONOMIC STABILITY: INCOME INSECURITY: HOW HARD IS IT FOR YOU TO PAY FOR THE VERY BASICS LIKE FOOD, HOUSING, MEDICAL CARE, AND HEATING?: NOT HARD AT ALL

## 2024-04-11 ASSESSMENT — ENCOUNTER SYMPTOMS
CHEST TIGHTNESS: 0
SHORTNESS OF BREATH: 0

## 2024-04-11 NOTE — PROGRESS NOTES
Medicare Annual Wellness Visit    Devante Tracy III is here for Medicare AWV and Thyroid Problem    Assessment & Plan   Medicare annual wellness visit, subsequent    Recommendations for Preventive Services Due: see orders and patient instructions/AVS.  Recommended screening schedule for the next 5-10 years is provided to the patient in written form: see Patient Instructions/AVS.     No follow-ups on file.     Subjective       Patient's complete Health Risk Assessment and screening values have been reviewed and are found in Flowsheets. The following problems were reviewed today and where indicated follow up appointments were made and/or referrals ordered.    Positive Risk Factor Screenings with Interventions:                Activity, Diet, and Weight:  On average, how many days per week do you engage in moderate to strenuous exercise (like a brisk walk)?: 7 days  On average, how many minutes do you engage in exercise at this level?: 30 min    Do you eat balanced/healthy meals regularly?: Yes    Body mass index is 31.8 kg/m². (!) Abnormal    Obesity Interventions:  Patient declines any further evaluation or treatment             Hearing Screen:  Do you or your family notice any trouble with your hearing that hasn't been managed with hearing aids?: (!) Yes    Interventions:  See AVS for additional education material    Vision Screen:  Do you have difficulty driving, watching TV, or doing any of your daily activities because of your eyesight?: No  Have you had an eye exam within the past year?: (!) No  No results found.    Interventions:   See AVS for additional education material    Safety:  Do you have any tripping hazards - loose or unsecured carpets or rugs?: (!) Yes  Interventions:  See AVS for additional education material                   Objective   Vitals:    04/11/24 1440   BP: 119/77   Site: Left Upper Arm   Position: Sitting   Cuff Size: Medium Adult   Pulse: 65   Resp: 15   Temp: 97 °F (36.1 °C)   TempSrc:

## 2024-04-11 NOTE — PROGRESS NOTES
HISTORY OF PRESENT ILLNESS      Devante Tracy III is a 68 y.o. male.    /77 (Site: Left Upper Arm, Position: Sitting, Cuff Size: Medium Adult)   Pulse 65   Temp 97 °F (36.1 °C) (Temporal)   Resp 15   Ht 1.676 m (5' 6\")   Wt 89.4 kg (197 lb)   SpO2 96%   BMI 31.80 kg/m²     Thyroid Problem  Presents for follow-up visit. Patient reports no palpitations. The symptoms have been stable.       Review of Systems   Constitutional: Negative.    Respiratory:  Negative for chest tightness and shortness of breath.    Cardiovascular:  Negative for chest pain, palpitations and leg swelling.   Neurological: Negative.            Physical Exam  Vitals and nursing note reviewed.   Constitutional:       Appearance: Normal appearance.   HENT:      Head: Normocephalic and atraumatic.   Cardiovascular:      Rate and Rhythm: Normal rate and regular rhythm.   Pulmonary:      Effort: Pulmonary effort is normal.      Breath sounds: Normal breath sounds.   Musculoskeletal:      Right lower leg: No edema.      Left lower leg: No edema.   Skin:     General: Skin is warm and dry.   Neurological:      General: No focal deficit present.      Mental Status: He is alert and oriented to person, place, and time.   Psychiatric:         Mood and Affect: Mood normal.         Behavior: Behavior normal.         ASSESSMENT and PLAN      ICD-10-CM    2. Acquired hypothyroidism  E03.9 Comprehensive Metabolic Panel     TSH + Free T4 Panel      3. Atrial fibrillation, unspecified type (HCC)  I48.91 Lipid Panel          Comprehensive Metabolic Panel            Managed by cardiology  TSH + Free T4 Panel      4. ASCVD (arteriosclerotic cardiovascular disease)  I25.10 Lipid Panel            Monitored by cardiology      Doing well overall. Feels well    Lung cancer is stable. F/U with oncology    Update lab    F/u 6 months for thyroid

## 2024-10-10 ENCOUNTER — HOSPITAL ENCOUNTER (OUTPATIENT)
Facility: HOSPITAL | Age: 69
Setting detail: SPECIMEN
Discharge: HOME | End: 2024-10-13
Payer: MEDICARE

## 2024-10-10 ENCOUNTER — OFFICE VISIT (OUTPATIENT)
Facility: CLINIC | Age: 69
End: 2024-10-10

## 2024-10-10 VITALS
OXYGEN SATURATION: 97 % | SYSTOLIC BLOOD PRESSURE: 127 MMHG | RESPIRATION RATE: 16 BRPM | BODY MASS INDEX: 28.8 KG/M2 | HEIGHT: 70 IN | HEART RATE: 78 BPM | TEMPERATURE: 97.2 F | WEIGHT: 201.2 LBS | DIASTOLIC BLOOD PRESSURE: 74 MMHG

## 2024-10-10 DIAGNOSIS — I25.10 ASCVD (ARTERIOSCLEROTIC CARDIOVASCULAR DISEASE): ICD-10-CM

## 2024-10-10 DIAGNOSIS — Z23 ENCOUNTER FOR IMMUNIZATION: ICD-10-CM

## 2024-10-10 DIAGNOSIS — E03.9 ACQUIRED HYPOTHYROIDISM: ICD-10-CM

## 2024-10-10 DIAGNOSIS — E03.9 ACQUIRED HYPOTHYROIDISM: Primary | ICD-10-CM

## 2024-10-10 LAB
CHOLEST SERPL-MCNC: 186 MG/DL
HDLC SERPL-MCNC: 53 MG/DL (ref 40–60)
HDLC SERPL: 3.5 (ref 0–5)
LDLC SERPL CALC-MCNC: 104.8 MG/DL (ref 0–100)
LIPID PANEL: ABNORMAL
T4 FREE SERPL-MCNC: 1 NG/DL (ref 0.7–1.5)
TRIGL SERPL-MCNC: 141 MG/DL
TSH SERPL DL<=0.05 MIU/L-ACNC: 0.96 UIU/ML (ref 0.36–3.74)
VLDLC SERPL CALC-MCNC: 28.2 MG/DL

## 2024-10-10 PROCEDURE — 84439 ASSAY OF FREE THYROXINE: CPT

## 2024-10-10 PROCEDURE — 80061 LIPID PANEL: CPT

## 2024-10-10 PROCEDURE — 36415 COLL VENOUS BLD VENIPUNCTURE: CPT

## 2024-10-10 PROCEDURE — 84443 ASSAY THYROID STIM HORMONE: CPT

## 2024-10-10 SDOH — ECONOMIC STABILITY: FOOD INSECURITY: WITHIN THE PAST 12 MONTHS, YOU WORRIED THAT YOUR FOOD WOULD RUN OUT BEFORE YOU GOT MONEY TO BUY MORE.: NEVER TRUE

## 2024-10-10 SDOH — ECONOMIC STABILITY: INCOME INSECURITY: HOW HARD IS IT FOR YOU TO PAY FOR THE VERY BASICS LIKE FOOD, HOUSING, MEDICAL CARE, AND HEATING?: NOT HARD AT ALL

## 2024-10-10 SDOH — ECONOMIC STABILITY: FOOD INSECURITY: WITHIN THE PAST 12 MONTHS, THE FOOD YOU BOUGHT JUST DIDN'T LAST AND YOU DIDN'T HAVE MONEY TO GET MORE.: NEVER TRUE

## 2024-10-10 ASSESSMENT — ENCOUNTER SYMPTOMS: RESPIRATORY NEGATIVE: 1

## 2024-10-10 NOTE — PROGRESS NOTES
HISTORY OF PRESENT ILLNESS      Devante Tracy III is a 69 y.o. male.    /74 (Site: Left Upper Arm, Position: Sitting, Cuff Size: Medium Adult)   Pulse 78   Temp 97.2 °F (36.2 °C) (Temporal)   Resp 16   Ht 1.778 m (5' 10\")   Wt 91.3 kg (201 lb 3.2 oz)   SpO2 97%   BMI 28.87 kg/m²         Pt here to f/u on his thyroid.  Became a problem after his surgery and radiation Tx in 2014    He also has lung cancer and is s/p XRT. Last scan in July looked good.     Thyroid Problem  Presents for follow-up visit.       Review of Systems   Constitutional: Negative.    Respiratory: Negative.     Cardiovascular: Negative.    Neurological: Negative.            Physical Exam  Vitals and nursing note reviewed.   Constitutional:       Appearance: Normal appearance.   HENT:      Head: Normocephalic and atraumatic.   Cardiovascular:      Rate and Rhythm: Normal rate and regular rhythm.   Pulmonary:      Effort: Pulmonary effort is normal.      Breath sounds: Normal breath sounds.   Musculoskeletal:      Right lower leg: No edema.      Left lower leg: No edema.   Skin:     General: Skin is warm and dry.   Neurological:      General: No focal deficit present.      Mental Status: He is alert and oriented to person, place, and time.   Psychiatric:         Mood and Affect: Mood normal.         Behavior: Behavior normal.         ASSESSMENT and PLAN      ICD-10-CM    1. Acquired hypothyroidism  E03.9 TSH + Free T4 Panel      2. ASCVD (arteriosclerotic cardiovascular disease)  I25.10 Lipid Panel      3. Encounter for immunization  Z23 Influenza, FLUAD Trivalent, (age 65 y+), IM, Preservative Free, 0.5mL         Thyroid has been stable    Ascvd has been stable. Due for cholesterol  check    Due for updated lab    F/u 6 months for MWV, thyroid, chol recheck

## 2024-10-10 NOTE — PROGRESS NOTES
After obtaining consent, and per orders of MADHAV JOSEPH MD  injection of Flu ad given in Left deltoid by Carlos Brown MA. Patient instructed to remain in clinic for 20 minutes afterwards, and to report any adverse reaction to me immediately.

## 2024-10-10 NOTE — PROGRESS NOTES
\"Have you been to the ER, urgent care clinic since your last visit?  Hospitalized since your last visit?\"    NO    “Have you seen or consulted any other health care providers outside of [facility] since your last visit?”    YES - When: approximately 1 months ago.  Where and Why: [facility] .            Click Here for Release of Records Request

## 2025-03-07 RX ORDER — TAMSULOSIN HYDROCHLORIDE 0.4 MG/1
0.4 CAPSULE ORAL NIGHTLY
Qty: 90 CAPSULE | Refills: 3 | Status: SHIPPED | OUTPATIENT
Start: 2025-03-07

## 2025-03-07 NOTE — TELEPHONE ENCOUNTER
Last Appointment:  10/10/2024  Future Appointments   Date Time Provider Department Center   4/29/2025  2:30 PM Shantelle Santillan MD [facility]

## 2025-04-26 SDOH — HEALTH STABILITY: PHYSICAL HEALTH: ON AVERAGE, HOW MANY DAYS PER WEEK DO YOU ENGAGE IN MODERATE TO STRENUOUS EXERCISE (LIKE A BRISK WALK)?: 6 DAYS

## 2025-04-26 SDOH — HEALTH STABILITY: PHYSICAL HEALTH: ON AVERAGE, HOW MANY MINUTES DO YOU ENGAGE IN EXERCISE AT THIS LEVEL?: 20 MIN

## 2025-04-26 SDOH — ECONOMIC STABILITY: INCOME INSECURITY: IN THE LAST 12 MONTHS, WAS THERE A TIME WHEN YOU WERE NOT ABLE TO PAY THE MORTGAGE OR RENT ON TIME?: NO

## 2025-04-26 SDOH — ECONOMIC STABILITY: TRANSPORTATION INSECURITY
IN THE PAST 12 MONTHS, HAS LACK OF TRANSPORTATION KEPT YOU FROM MEETINGS, WORK, OR FROM GETTING THINGS NEEDED FOR DAILY LIVING?: NO

## 2025-04-26 SDOH — ECONOMIC STABILITY: FOOD INSECURITY: WITHIN THE PAST 12 MONTHS, THE FOOD YOU BOUGHT JUST DIDN'T LAST AND YOU DIDN'T HAVE MONEY TO GET MORE.: NEVER TRUE

## 2025-04-26 SDOH — ECONOMIC STABILITY: FOOD INSECURITY: WITHIN THE PAST 12 MONTHS, YOU WORRIED THAT YOUR FOOD WOULD RUN OUT BEFORE YOU GOT MONEY TO BUY MORE.: NEVER TRUE

## 2025-04-26 ASSESSMENT — PATIENT HEALTH QUESTIONNAIRE - PHQ9
SUM OF ALL RESPONSES TO PHQ QUESTIONS 1-9: 0
2. FEELING DOWN, DEPRESSED OR HOPELESS: NOT AT ALL
1. LITTLE INTEREST OR PLEASURE IN DOING THINGS: NOT AT ALL

## 2025-04-26 ASSESSMENT — LIFESTYLE VARIABLES
HOW MANY STANDARD DRINKS CONTAINING ALCOHOL DO YOU HAVE ON A TYPICAL DAY: PATIENT DOES NOT DRINK
HOW OFTEN DO YOU HAVE A DRINK CONTAINING ALCOHOL: 1
HOW MANY STANDARD DRINKS CONTAINING ALCOHOL DO YOU HAVE ON A TYPICAL DAY: 0
HOW OFTEN DO YOU HAVE A DRINK CONTAINING ALCOHOL: NEVER
HOW OFTEN DO YOU HAVE SIX OR MORE DRINKS ON ONE OCCASION: 1

## 2025-04-29 ENCOUNTER — HOSPITAL ENCOUNTER (OUTPATIENT)
Facility: HOSPITAL | Age: 70
Setting detail: SPECIMEN
Discharge: HOME | End: 2025-05-02
Payer: MEDICARE

## 2025-04-29 ENCOUNTER — OFFICE VISIT (OUTPATIENT)
Facility: CLINIC | Age: 70
End: 2025-04-29
Payer: MEDICARE

## 2025-04-29 VITALS
HEART RATE: 84 BPM | HEIGHT: 70 IN | DIASTOLIC BLOOD PRESSURE: 80 MMHG | BODY MASS INDEX: 28.63 KG/M2 | OXYGEN SATURATION: 97 % | WEIGHT: 200 LBS | TEMPERATURE: 98.9 F | SYSTOLIC BLOOD PRESSURE: 120 MMHG

## 2025-04-29 DIAGNOSIS — I25.10 ASCVD (ARTERIOSCLEROTIC CARDIOVASCULAR DISEASE): ICD-10-CM

## 2025-04-29 DIAGNOSIS — Z85.118 HX OF MALIGNANT NEOPLASM OF LUNG: ICD-10-CM

## 2025-04-29 DIAGNOSIS — Z00.00 MEDICARE ANNUAL WELLNESS VISIT, SUBSEQUENT: Primary | ICD-10-CM

## 2025-04-29 DIAGNOSIS — I48.91 ATRIAL FIBRILLATION, UNSPECIFIED TYPE (HCC): ICD-10-CM

## 2025-04-29 DIAGNOSIS — Z71.89 ADVANCE CARE PLANNING: ICD-10-CM

## 2025-04-29 DIAGNOSIS — E03.9 ACQUIRED HYPOTHYROIDISM: ICD-10-CM

## 2025-04-29 LAB
ALBUMIN SERPL-MCNC: 3.7 G/DL (ref 3.4–5)
ALBUMIN/GLOB SERPL: 1.3 (ref 0.8–1.7)
ALP SERPL-CCNC: 79 U/L (ref 45–117)
ALT SERPL-CCNC: 21 U/L (ref 10–50)
ANION GAP SERPL CALC-SCNC: 11 MMOL/L (ref 3–18)
AST SERPL-CCNC: 27 U/L (ref 10–38)
BILIRUB SERPL-MCNC: 0.3 MG/DL (ref 0.2–1)
BUN SERPL-MCNC: 11 MG/DL (ref 6–23)
BUN/CREAT SERPL: 13 (ref 12–20)
CALCIUM SERPL-MCNC: 9.6 MG/DL (ref 8.5–10.1)
CHLORIDE SERPL-SCNC: 103 MMOL/L (ref 98–107)
CHOLEST SERPL-MCNC: 192 MG/DL
CO2 SERPL-SCNC: 26 MMOL/L (ref 21–32)
CREAT SERPL-MCNC: 0.88 MG/DL (ref 0.6–1.3)
GLOBULIN SER CALC-MCNC: 2.8 G/DL (ref 2–4)
GLUCOSE SERPL-MCNC: 96 MG/DL (ref 74–108)
HDLC SERPL-MCNC: 49 MG/DL (ref 40–60)
HDLC SERPL: 3.9 (ref 0–5)
LDLC SERPL CALC-MCNC: 112 MG/DL (ref 0–100)
POTASSIUM SERPL-SCNC: 4.9 MMOL/L (ref 3.5–5.5)
PROT SERPL-MCNC: 6.5 G/DL (ref 6.4–8.2)
SODIUM SERPL-SCNC: 139 MMOL/L (ref 136–145)
TRIGL SERPL-MCNC: 156 MG/DL (ref 0–150)
VLDLC SERPL CALC-MCNC: 31 MG/DL

## 2025-04-29 PROCEDURE — 84439 ASSAY OF FREE THYROXINE: CPT

## 2025-04-29 PROCEDURE — 80061 LIPID PANEL: CPT

## 2025-04-29 PROCEDURE — 1123F ACP DISCUSS/DSCN MKR DOCD: CPT | Performed by: INTERNAL MEDICINE

## 2025-04-29 PROCEDURE — G8427 DOCREV CUR MEDS BY ELIG CLIN: HCPCS | Performed by: INTERNAL MEDICINE

## 2025-04-29 PROCEDURE — 84443 ASSAY THYROID STIM HORMONE: CPT

## 2025-04-29 PROCEDURE — G2211 COMPLEX E/M VISIT ADD ON: HCPCS | Performed by: INTERNAL MEDICINE

## 2025-04-29 PROCEDURE — 3017F COLORECTAL CA SCREEN DOC REV: CPT | Performed by: INTERNAL MEDICINE

## 2025-04-29 PROCEDURE — 36415 COLL VENOUS BLD VENIPUNCTURE: CPT

## 2025-04-29 PROCEDURE — 1159F MED LIST DOCD IN RCRD: CPT | Performed by: INTERNAL MEDICINE

## 2025-04-29 PROCEDURE — 99497 ADVNCD CARE PLAN 30 MIN: CPT | Performed by: INTERNAL MEDICINE

## 2025-04-29 PROCEDURE — 99214 OFFICE O/P EST MOD 30 MIN: CPT | Performed by: INTERNAL MEDICINE

## 2025-04-29 PROCEDURE — 1036F TOBACCO NON-USER: CPT | Performed by: INTERNAL MEDICINE

## 2025-04-29 PROCEDURE — 80053 COMPREHEN METABOLIC PANEL: CPT

## 2025-04-29 PROCEDURE — G0439 PPPS, SUBSEQ VISIT: HCPCS | Performed by: INTERNAL MEDICINE

## 2025-04-29 PROCEDURE — G8417 CALC BMI ABV UP PARAM F/U: HCPCS | Performed by: INTERNAL MEDICINE

## 2025-04-29 SDOH — ECONOMIC STABILITY: FOOD INSECURITY: WITHIN THE PAST 12 MONTHS, THE FOOD YOU BOUGHT JUST DIDN'T LAST AND YOU DIDN'T HAVE MONEY TO GET MORE.: NEVER TRUE

## 2025-04-29 SDOH — ECONOMIC STABILITY: FOOD INSECURITY: WITHIN THE PAST 12 MONTHS, YOU WORRIED THAT YOUR FOOD WOULD RUN OUT BEFORE YOU GOT MONEY TO BUY MORE.: NEVER TRUE

## 2025-04-29 ASSESSMENT — ENCOUNTER SYMPTOMS: RESPIRATORY NEGATIVE: 1

## 2025-04-29 NOTE — PROGRESS NOTES
HISTORY OF PRESENT ILLNESS      Devante Tracy III is a 69 y.o. male.    /80 (BP Site: Left Upper Arm, Patient Position: Sitting, BP Cuff Size: Large Adult)   Pulse 84   Temp 98.9 °F (37.2 °C) (Temporal)   Ht 1.778 m (5' 10\")   Wt 90.7 kg (200 lb)   SpO2 97%   BMI 28.70 kg/m²     Thyroid Problem  Presents for follow-up visit.       Review of Systems   Constitutional: Negative.    Respiratory: Negative.     Cardiovascular: Negative.    Neurological: Negative.            Physical Exam  Vitals and nursing note reviewed.   Constitutional:       Appearance: Normal appearance.   HENT:      Head: Normocephalic and atraumatic.   Cardiovascular:      Rate and Rhythm: Normal rate and regular rhythm.   Pulmonary:      Effort: Pulmonary effort is normal.      Breath sounds: Normal breath sounds.   Musculoskeletal:      Right lower leg: No edema.      Left lower leg: No edema.   Skin:     General: Skin is warm and dry.   Neurological:      General: No focal deficit present.      Mental Status: He is alert and oriented to person, place, and time.   Psychiatric:         Mood and Affect: Mood normal.         Behavior: Behavior normal.         ASSESSMENT and PLAN      ICD-10-CM    3. Acquired hypothyroidism  E03.9 TSH + Free T4 Panel      4. ASCVD (arteriosclerotic cardiovascular disease)  I25.10 Comprehensive Metabolic Panel     Lipid Panel      5. Atrial fibrillation, unspecified type (HCC)  I48.91     managed by specialist      6. Hx of malignant neoplasm of lung  Z85.118     Followed by oncology         Thyroid due for recheck.  Has been stable    ASCVD stable and monitored by cardiology    Update lab    F/u with oncology for his lung cancer.    F/u 6 months for thyroid and cholesterol

## 2025-04-29 NOTE — PROGRESS NOTES
Have you been to the ER, urgent care clinic since your last visit?  Hospitalized since your last visit?   NO    Have you seen or consulted any other health care providers outside our system since your last visit?   NO

## 2025-04-29 NOTE — PROGRESS NOTES
Medicare Annual Wellness Visit    Devante Tracy III is here for Medicare AWV    Assessment & Plan   Medicare annual wellness visit, subsequent  Advance care planning         Return in about 6 months (around 10/29/2025) for thyroid.     Subjective       Patient's complete Health Risk Assessment and screening values have been reviewed and are found in Flowsheets. The following problems were reviewed today and where indicated follow up appointments were made and/or referrals ordered.    Positive Risk Factor Screenings with Interventions:                 Dentist Screen:  Have you seen the dentist within the past year?: (!) (Patient-Rptd) No    Intervention:  Has no teeth. Does not need to see his dentist as no intervention is planned.    Hearing Screen:  Do you or your family notice any trouble with your hearing that hasn't been managed with hearing aids?: (!) (Patient-Rptd) Yes    Interventions:  Has regular f/u    Vision Screen:  Do you have difficulty driving, watching TV, or doing any of your daily activities because of your eyesight?: (Patient-Rptd) No  Have you had an eye exam within the past year?: (!) (Patient-Rptd) No  Interventions:   Patient encouraged to make appointment with their eye specialist    Safety:  Do you have any tripping hazards - loose or unsecured carpets or rugs?: (!) (Patient-Rptd) Yes  Interventions:  See AVS for additional education material     Advanced Directives:  Do you have a Living Will?: (!) (Patient-Rptd) No    Intervention:  see ACP note    Advance Care Planning   Discussed the patient’s choices for care and treatment preferences in case of a health event that adversely affects decision-making abilities or is life-limiting. Recommended the patient document care preferences in state-specific advance directives. Also reviewed the process of designating a trusted capable adult as an Agent (or Health Care Power of ) to make health care decisions for the patient if the patient

## 2025-04-30 LAB
T4 FREE SERPL-MCNC: 1.4 NG/DL (ref 0.9–1.7)
TSH, 3RD GENERATION: 1.65 UIU/ML (ref 0.27–4.2)

## 2025-05-01 ENCOUNTER — RESULTS FOLLOW-UP (OUTPATIENT)
Facility: CLINIC | Age: 70
End: 2025-05-01

## 2025-05-06 RX ORDER — LEVOTHYROXINE SODIUM 88 UG/1
88 TABLET ORAL
Qty: 90 TABLET | Refills: 3 | Status: SHIPPED | OUTPATIENT
Start: 2025-05-06

## 2025-05-06 RX ORDER — ACYCLOVIR 400 MG/1
400 TABLET ORAL DAILY
Qty: 90 TABLET | Refills: 3 | Status: SHIPPED | OUTPATIENT
Start: 2025-05-06